# Patient Record
Sex: MALE | Race: WHITE | NOT HISPANIC OR LATINO | Employment: OTHER | RURAL
[De-identification: names, ages, dates, MRNs, and addresses within clinical notes are randomized per-mention and may not be internally consistent; named-entity substitution may affect disease eponyms.]

---

## 2018-01-03 ENCOUNTER — OFFICE VISIT (OUTPATIENT)
Dept: OTOLARYNGOLOGY | Facility: CLINIC | Age: 63
End: 2018-01-03

## 2018-01-03 VITALS — HEIGHT: 68 IN | TEMPERATURE: 96.7 F | WEIGHT: 162 LBS | BODY MASS INDEX: 24.55 KG/M2

## 2018-01-03 DIAGNOSIS — H90.3 SENSORINEURAL HEARING LOSS (SNHL) OF BOTH EARS: ICD-10-CM

## 2018-01-03 DIAGNOSIS — H69.91 EUSTACHIAN TUBE DISORDER, RIGHT: ICD-10-CM

## 2018-01-03 DIAGNOSIS — J34.2 NASAL SEPTAL DEVIATION: ICD-10-CM

## 2018-01-03 DIAGNOSIS — J34.3 NASAL TURBINATE HYPERTROPHY: ICD-10-CM

## 2018-01-03 DIAGNOSIS — J32.8 OTHER CHRONIC SINUSITIS: Primary | ICD-10-CM

## 2018-01-03 PROCEDURE — 99204 OFFICE O/P NEW MOD 45 MIN: CPT | Performed by: OTOLARYNGOLOGY

## 2018-01-03 RX ORDER — FLUTICASONE PROPIONATE 50 MCG
2 SPRAY, SUSPENSION (ML) NASAL
Qty: 18 G | Refills: 11 | Status: SHIPPED | OUTPATIENT
Start: 2018-01-03 | End: 2018-01-31

## 2018-01-03 RX ORDER — CETIRIZINE HYDROCHLORIDE 10 MG/1
TABLET ORAL
COMMUNITY
Start: 2017-12-28 | End: 2018-01-31

## 2018-01-03 RX ORDER — CEFDINIR 300 MG/1
300 CAPSULE ORAL
Qty: 36 CAPSULE | Refills: 0 | Status: SHIPPED | OUTPATIENT
Start: 2018-01-03 | End: 2018-01-31

## 2018-01-03 RX ORDER — FLUTICASONE PROPIONATE 50 MCG
SPRAY, SUSPENSION (ML) NASAL
COMMUNITY
Start: 2017-12-28 | End: 2018-01-03 | Stop reason: SDUPTHER

## 2018-01-03 NOTE — PROGRESS NOTES
Subjective   Joseph Heard is a 62 y.o. male.   Cc worsening hearing  And nasal obstruction  History of Present Illness     Patient comes in with a history of ear stopped up hearing worse and almost failed a CDL exam he has long-standing hearing loss from noise exposure is here does get worse and she had sinus infection congestion.  Still having persistent headache trouble breathing through his nose despite antibiotics and nasal sprays and antihistamines he comes in with audiogram shows hearing loss.  Says he uses hearing protection he does have tinnitus in both ears syringe showed symmetric hearing loss    The following portions of the patient's history were reviewed and updated as appropriate: allergies, current medications, past family history, past medical history, past social history, past surgical history and problem list.      Joseph Heard reports that he has never smoked. He has never used smokeless tobacco.  Patient is not a tobacco user and has not been counseled for use of tobacco products    Family History   Problem Relation Age of Onset   • Heart disease Mother    • Thyroid disease Mother    • Heart disease Father          Current Outpatient Prescriptions:   •  cetirizine (zyrTEC) 10 MG tablet, , Disp: , Rfl:   •  fluticasone (FLONASE) 50 MCG/ACT nasal spray, 2 sprays into each nostril 2 (Two) Times a Day., Disp: 18 g, Rfl: 11  •  cefdinir (OMNICEF) 300 MG capsule, Take 1 capsule by mouth 2 (Two) Times a Day. Take with food and probiotics and call office and stop if develop watery diarrhea., Disp: 36 capsule, Rfl: 0    No Known Allergies    No past medical history on file.      Review of Systems   HENT: Positive for congestion, hearing loss, sinus pressure and tinnitus. Negative for ear discharge, ear pain and facial swelling.    Hematological: Negative for adenopathy.   All other systems reviewed and are negative.          Objective   Physical Exam   Constitutional: He is oriented to person,  place, and time. He appears well-developed and well-nourished.   HENT:   Head: Normocephalic and atraumatic.   Right Ear: Hearing, tympanic membrane, external ear and ear canal normal.   Left Ear: Hearing, tympanic membrane, external ear and ear canal normal.   Nose: Mucosal edema, rhinorrhea, nasal deformity and septal deviation present. No sinus tenderness. No epistaxis. Right sinus exhibits no maxillary sinus tenderness and no frontal sinus tenderness. Left sinus exhibits no maxillary sinus tenderness and no frontal sinus tenderness.   Mouth/Throat: Uvula is midline, oropharynx is clear and moist and mucous membranes are normal. No trismus in the jaw. Normal dentition. No oropharyngeal exudate or posterior oropharyngeal edema. Tonsils are 1+ on the right. Tonsils are 1+ on the left. No tonsillar exudate.   Eyes: Conjunctivae are normal.   Neck: Normal range of motion. Neck supple. No JVD present. No tracheal deviation present. No thyromegaly present.   Cardiovascular: Normal rate.    Pulmonary/Chest: Effort normal.   Musculoskeletal: Normal range of motion.   Lymphadenopathy:        Head (right side): No submental, no submandibular, no tonsillar, no preauricular, no posterior auricular and no occipital adenopathy present.        Head (left side): No submental, no submandibular, no tonsillar, no preauricular, no posterior auricular and no occipital adenopathy present.     He has no cervical adenopathy.        Right cervical: No superficial cervical, no deep cervical and no posterior cervical adenopathy present.       Left cervical: No superficial cervical, no deep cervical and no posterior cervical adenopathy present.   Neurological: He is alert and oriented to person, place, and time. No cranial nerve deficit.   Skin: Skin is warm.   Psychiatric: He has a normal mood and affect. His speech is normal and behavior is normal. Thought content normal.   Nursing note and vitals reviewed.    See any obvious fluid today  though had an abnormal tympanogram previously month and have  See the audiogram which was reviewed with the patient      Assessment/Plan   Joseph was seen today for ear problem.    Diagnoses and all orders for this visit:    Other chronic sinusitis  -     CT Sinus Without Contrast; Future    Eustachian tube disorder, right    Sensorineural hearing loss (SNHL) of both ears    Nasal septal deviation    Nasal turbinate hypertrophy    Other orders  -     fluticasone (FLONASE) 50 MCG/ACT nasal spray; 2 sprays into each nostril 2 (Two) Times a Day.  -     cefdinir (OMNICEF) 300 MG capsule; Take 1 capsule by mouth 2 (Two) Times a Day. Take with food and probiotics and call office and stop if develop watery diarrhea.     noise protection    F/u CT  And med use  I explained use and side effects medications minimize side effects with food use and probiotics  We will repeat the tympanogram on follow-up

## 2018-01-31 ENCOUNTER — OFFICE VISIT (OUTPATIENT)
Dept: OTOLARYNGOLOGY | Facility: CLINIC | Age: 63
End: 2018-01-31

## 2018-01-31 VITALS — HEIGHT: 68 IN | TEMPERATURE: 98.5 F | BODY MASS INDEX: 24.25 KG/M2 | WEIGHT: 160 LBS

## 2018-01-31 DIAGNOSIS — J32.2 CHRONIC ETHMOIDAL SINUSITIS: ICD-10-CM

## 2018-01-31 DIAGNOSIS — J34.3 NASAL TURBINATE HYPERTROPHY: ICD-10-CM

## 2018-01-31 DIAGNOSIS — H69.91 EUSTACHIAN TUBE DISORDER, RIGHT: ICD-10-CM

## 2018-01-31 DIAGNOSIS — J01.10 ACUTE FRONTAL SINUSITIS, RECURRENCE NOT SPECIFIED: ICD-10-CM

## 2018-01-31 DIAGNOSIS — J34.2 NASAL SEPTAL DEVIATION: Primary | ICD-10-CM

## 2018-01-31 DIAGNOSIS — J34.89 NASAL VALVE COLLAPSE: ICD-10-CM

## 2018-01-31 PROCEDURE — 99214 OFFICE O/P EST MOD 30 MIN: CPT | Performed by: OTOLARYNGOLOGY

## 2018-01-31 RX ORDER — AMOXICILLIN AND CLAVULANATE POTASSIUM 875; 125 MG/1; MG/1
1 TABLET, FILM COATED ORAL EVERY 12 HOURS
Qty: 36 TABLET | Refills: 0 | Status: SHIPPED | OUTPATIENT
Start: 2018-01-31 | End: 2018-03-07

## 2018-01-31 RX ORDER — AZELASTINE 1 MG/ML
2 SPRAY, METERED NASAL
Qty: 30 ML | Refills: 5 | Status: ON HOLD | OUTPATIENT
Start: 2018-01-31 | End: 2018-04-06

## 2018-01-31 RX ORDER — ATORVASTATIN CALCIUM 10 MG/1
5 TABLET, FILM COATED ORAL NIGHTLY
Status: ON HOLD | COMMUNITY
Start: 2018-01-13 | End: 2020-05-29

## 2018-01-31 NOTE — PROGRESS NOTES
Subjective   Joseph Heard is a 62 y.o. male.   CC: F/u chronic sinusitis nasal obstruction  History of Present Illness   Patient still having frontal headaches especially in the mornings he's not had any fever chills is not particularly more one side to the other still has trouble breathing through his nose is had a little bit of epistaxis on the left side    Is not febrile and there is no neurologic complaints does have decreased sense of smell      The following portions of the patient's history were reviewed and updated as appropriate: allergies, current medications, past family history, past medical history, past social history, past surgical history and problem list.      Joseph Heard reports that he has never smoked. He has never used smokeless tobacco. He reports that he drinks alcohol. He reports that he does not use illicit drugs.  Patient is not a tobacco user and has not been counseled for use of tobacco products    Family History   Problem Relation Age of Onset   • Heart disease Mother    • Thyroid disease Mother    • Heart disease Father          Current Outpatient Prescriptions:   •  atorvastatin (LIPITOR) 10 MG tablet, Take 5 mg by mouth Daily., Disp: , Rfl:   •  amoxicillin-clavulanate (AUGMENTIN) 875-125 MG per tablet, Take 1 tablet by mouth Every 12 (Twelve) Hours., Disp: 36 tablet, Rfl: 0  •  azelastine (ASTELIN) 0.1 % nasal spray, 2 sprays into each nostril 2 (Two) Times a Day. Use in each nostril as directed, Disp: 30 mL, Rfl: 5    No Known Allergies    No past medical history on file.      Review of Systems   Constitutional: Negative for fever.   HENT: Positive for congestion, postnasal drip, rhinorrhea, sinus pain and sinus pressure. Negative for dental problem and facial swelling.    Neurological: Positive for headaches. Negative for facial asymmetry.   Hematological: Negative for adenopathy.           Objective   Physical Exam   Constitutional: He is oriented to person, place, and  time. He appears well-developed and well-nourished.   HENT:   Head: Normocephalic and atraumatic.   Right Ear: Tympanic membrane, external ear and ear canal normal.   Left Ear: Tympanic membrane, external ear and ear canal normal.   Nose: Mucosal edema, rhinorrhea, nasal deformity and septal deviation present. No sinus tenderness. No epistaxis. Right sinus exhibits no maxillary sinus tenderness and no frontal sinus tenderness. Left sinus exhibits no maxillary sinus tenderness and no frontal sinus tenderness.   Mouth/Throat: Uvula is midline, oropharynx is clear and moist and mucous membranes are normal. No trismus in the jaw. Normal dentition. No oropharyngeal exudate or posterior oropharyngeal edema. Tonsils are 1+ on the right. Tonsils are 1+ on the left. No tonsillar exudate.   Eyes: Conjunctivae are normal.   Neck: Normal range of motion. Neck supple. No JVD present. No tracheal deviation present. No thyromegaly present.   Cardiovascular: Normal rate.    Pulmonary/Chest: Effort normal.   Musculoskeletal: Normal range of motion.   Lymphadenopathy:        Head (right side): No submental, no submandibular, no tonsillar, no preauricular, no posterior auricular and no occipital adenopathy present.        Head (left side): No submental, no submandibular, no tonsillar, no preauricular, no posterior auricular and no occipital adenopathy present.     He has no cervical adenopathy.        Right cervical: No superficial cervical, no deep cervical and no posterior cervical adenopathy present.       Left cervical: No superficial cervical, no deep cervical and no posterior cervical adenopathy present.   Neurological: He is alert and oriented to person, place, and time. No cranial nerve deficit.   Skin: Skin is warm.   Psychiatric: He has a normal mood and affect. His speech is normal and behavior is normal. Thought content normal.   Nursing note and vitals reviewed.      I reviewed the report as well as 2 different CD scan of  the sinus disc from [facility] i did sinus CT stone 2 different occasions the most recent being a few days ago .  s still some mild sinusitis and some sinuses both sides but worse than left ethmoid frontal on the.  I reviewed this in detail with him showed and diagrams    he wants to avoid surgery possible sore ago treated with antibiotics and a different type of nasal spray.  See if it helps his eustachian tube symptoms to receive a some feedback on follow-up.  Improving and maintaining nasal surgery to help nasal valve collapse nasal septal deformity and sinusitis and turbinate reduction we discussed was found risks benefits of each approach and he prefers a medical approach first follow-up after she finishes medications he is to call if not improving or if he has questions or difficulty              Assessment/Plan   Joseph was seen today for follow-up.    Diagnoses and all orders for this visit:    Nasal septal deviation    Eustachian tube disorder, right    Nasal turbinate hypertrophy    Acute frontal sinusitis, recurrence not specified    Chronic ethmoidal sinusitis    Other orders  -     azelastine (ASTELIN) 0.1 % nasal spray; 2 sprays into each nostril 2 (Two) Times a Day. Use in each nostril as directed  -     amoxicillin-clavulanate (AUGMENTIN) 875-125 MG per tablet; Take 1 tablet by mouth Every 12 (Twelve) Hours.    Side effects of medication discussed in detail  Risk complications discussed in detail see above her tympanograms as well on follow-up

## 2018-02-02 DIAGNOSIS — J32.8 OTHER CHRONIC SINUSITIS: ICD-10-CM

## 2018-03-07 ENCOUNTER — OFFICE VISIT (OUTPATIENT)
Dept: OTOLARYNGOLOGY | Facility: CLINIC | Age: 63
End: 2018-03-07

## 2018-03-07 ENCOUNTER — PREP FOR SURGERY (OUTPATIENT)
Dept: OTHER | Facility: HOSPITAL | Age: 63
End: 2018-03-07

## 2018-03-07 VITALS — TEMPERATURE: 96.3 F | WEIGHT: 161 LBS | HEIGHT: 68 IN | BODY MASS INDEX: 24.4 KG/M2

## 2018-03-07 DIAGNOSIS — J34.3 NASAL TURBINATE HYPERTROPHY: ICD-10-CM

## 2018-03-07 DIAGNOSIS — J34.89 NASAL VALVE COLLAPSE: ICD-10-CM

## 2018-03-07 DIAGNOSIS — H90.3 SENSORINEURAL HEARING LOSS (SNHL) OF BOTH EARS: ICD-10-CM

## 2018-03-07 DIAGNOSIS — J32.2 CHRONIC ETHMOIDAL SINUSITIS: Primary | ICD-10-CM

## 2018-03-07 DIAGNOSIS — J34.2 NASAL SEPTAL DEVIATION: ICD-10-CM

## 2018-03-07 DIAGNOSIS — J34.89 NASAL VALVE STENOSIS: ICD-10-CM

## 2018-03-07 DIAGNOSIS — J32.4 PANSINUSITIS: Primary | ICD-10-CM

## 2018-03-07 DIAGNOSIS — J32.0 CHRONIC MAXILLARY SINUSITIS: ICD-10-CM

## 2018-03-07 DIAGNOSIS — H69.83 EUSTACHIAN TUBE DYSFUNCTION, BILATERAL: ICD-10-CM

## 2018-03-07 DIAGNOSIS — J32.1 CHRONIC FRONTAL SINUSITIS: ICD-10-CM

## 2018-03-07 DIAGNOSIS — H93.13 TINNITUS OF BOTH EARS: ICD-10-CM

## 2018-03-07 PROCEDURE — 99214 OFFICE O/P EST MOD 30 MIN: CPT | Performed by: OTOLARYNGOLOGY

## 2018-03-07 RX ORDER — OXYMETAZOLINE HYDROCHLORIDE 0.05 G/100ML
2 SPRAY NASAL
Status: CANCELLED | OUTPATIENT
Start: 2018-04-06

## 2018-03-07 RX ORDER — CLINDAMYCIN PHOSPHATE 900 MG/50ML
900 INJECTION INTRAVENOUS ONCE
Status: CANCELLED | OUTPATIENT
Start: 2018-04-06 | End: 2018-04-06

## 2018-03-07 RX ORDER — CETIRIZINE HYDROCHLORIDE 10 MG/1
10 TABLET ORAL NIGHTLY
COMMUNITY

## 2018-03-07 NOTE — PROGRESS NOTES
Subjective   Joseph Heard is a 62 y.o. male.   CC: Persistent nasal obstruction sinusitis  and eustachian tube problems  History of Present Illness   States she's intermittently having fullness and popping his ears and his rings worse at times his biggest complaint is nasal instruction rhinitis congestion he's continues Afrin regularly he said he uses other nasal sprays and took the antibiotics without success.  He has documented sinusitis based on CT scan.  He has decreased sense of smell from his breathing through his nose worse on left than right and intermittent epistaxis as a result of this.  7 with some drainage and postnasal drip is been treated with multiple medications without success.  He notes he has allergies and they've been worsening with the Pollok and increasing    Had 1 prior nasal surgery which helped some but didn't completely help his symptoms and he had subsequent nasal fracture which worsens his breathing much been particularly worse than last year or so  The following portions of the patient's history were reviewed and updated as appropriate: allergies, current medications, past family history, past medical history, past social history, past surgical history and problem list.      Joseph Heard reports that he has never smoked. He has never used smokeless tobacco. He reports that he drinks alcohol. He reports that he does not use illicit drugs.  Patient is not a tobacco user and has not been counseled for use of tobacco products    Family History   Problem Relation Age of Onset   • Heart disease Mother    • Thyroid disease Mother    • Heart disease Father          Current Outpatient Prescriptions:   •  atorvastatin (LIPITOR) 10 MG tablet, Take 5 mg by mouth Daily., Disp: , Rfl:   •  azelastine (ASTELIN) 0.1 % nasal spray, 2 sprays into each nostril 2 (Two) Times a Day. Use in each nostril as directed, Disp: 30 mL, Rfl: 5  •  cetirizine (zyrTEC) 10 MG tablet, Take 10 mg by mouth  Daily., Disp: , Rfl:     No Known Allergies    Past Medical History:   Diagnosis Date   • Hyperlipemia          Review of Systems   Constitutional: Negative for fever.   HENT: Positive for congestion, ear pain, hearing loss, nosebleeds, sinus pressure and tinnitus. Negative for facial swelling.    Respiratory: Negative for apnea.            Objective   Physical Exam   Constitutional: He is oriented to person, place, and time. He appears well-developed and well-nourished.   HENT:   Head: Normocephalic and atraumatic.   Right Ear: Hearing, external ear and ear canal normal. Tympanic membrane is retracted. No middle ear effusion.   Left Ear: Hearing, external ear and ear canal normal. Tympanic membrane is retracted.  No middle ear effusion.   Nose: Mucosal edema, sinus tenderness and septal deviation present. No rhinorrhea or nasal deformity. No epistaxis. Right sinus exhibits no maxillary sinus tenderness and no frontal sinus tenderness. Left sinus exhibits no maxillary sinus tenderness and no frontal sinus tenderness.       Mouth/Throat: Uvula is midline and oropharynx is clear and moist. No trismus in the jaw. Normal dentition. No oropharyngeal exudate or posterior oropharyngeal edema.   Eyes: Conjunctivae are normal.   Neck: Normal range of motion. Neck supple. No JVD present. No tracheal deviation present. No thyromegaly present.   Cardiovascular: Normal rate and regular rhythm.    Pulmonary/Chest: Effort normal and breath sounds normal.   Musculoskeletal: Normal range of motion.   Lymphadenopathy:        Head (right side): No submental, no submandibular, no tonsillar, no preauricular, no posterior auricular and no occipital adenopathy present.        Head (left side): No submental, no submandibular, no tonsillar, no preauricular, no posterior auricular and no occipital adenopathy present.     He has no cervical adenopathy.        Right cervical: No superficial cervical, no deep cervical and no posterior cervical  adenopathy present.       Left cervical: No superficial cervical, no deep cervical and no posterior cervical adenopathy present.   Neurological: He is alert and oriented to person, place, and time. No cranial nerve deficit.   Skin: Skin is warm.   Psychiatric: He has a normal mood and affect. His speech is normal and behavior is normal. Thought content normal.   Nursing note and vitals reviewed.    Has a ptotic tip of the nose with late nasal valve collapse worse on left and right explained and recommended doing surgery changes appearance but we could try and improve the airway    The CT scan was reviewed from [facility] reviewed with the patient showing sinusitis and all multiple sinuses and nasal obstruction    Assessment/Plan   Joseph was seen today for follow-up.    Diagnoses and all orders for this visit:    Chronic ethmoidal sinusitis    Nasal septal deviation    Sensorineural hearing loss (SNHL) of both ears    Nasal turbinate hypertrophy    Eustachian tube dysfunction, bilateral    Chronic maxillary sinusitis    Chronic frontal sinusitis    Tinnitus of both ears    Nasal valve collapse        She is not interested in having PE tubes placed sore getting audiogram tympanogram and it's available.  We discussed nasal surgery.  Failure rates and success rate were discussed there is no guarantee this was all the symptoms.  I told him is about 10% chance it would not control his breathing and  could make things worse.  I discussed that nasal bleeding will not be guaranteed to be  controlled and certainly allergies are not controlled with surgery and will need continue treatment for that discussed risk of damage to his eye brain decreased vision double vision failure to he'll need for further treatment limitations of activity.  Discussed importance of stopping aspirin and vitamin E and fish well week before the week after surgery to minimize the risk of bleeding and limitations of activity.     Explained that  there could be some change in appearance of probably wouldn't be great if we're to try and improve his nasal valve and photographed and he understands and accepts that.  Now on PE tubes I discussed the eustachian tube problems may persist may need further treatment is to call for questions.  Denies other significant medical illness which would contraindicate surgery or anesthesia.      Talked about the sinus surgery  as it relates to sinus CT findings previous treatment think that be beneficial to open up the maxillary ethmoid sinuses possibly frontal (findings he agrees with this and understands limitations recovery rates success rate does not want consider further medical therapy is been on multiple medications including steroid nasal sprays and antihistamine nasal sprays over-the-counter antihistamines multiple antibiotics his CT documented disease

## 2018-04-02 ENCOUNTER — APPOINTMENT (OUTPATIENT)
Dept: PREADMISSION TESTING | Facility: HOSPITAL | Age: 63
End: 2018-04-02

## 2018-04-02 VITALS
DIASTOLIC BLOOD PRESSURE: 68 MMHG | BODY MASS INDEX: 25.01 KG/M2 | WEIGHT: 165 LBS | RESPIRATION RATE: 18 BRPM | HEIGHT: 68 IN | HEART RATE: 68 BPM | OXYGEN SATURATION: 97 % | SYSTOLIC BLOOD PRESSURE: 122 MMHG

## 2018-04-02 RX ORDER — ASPIRIN 81 MG/1
81 TABLET, CHEWABLE ORAL DAILY
COMMUNITY
End: 2018-04-06 | Stop reason: HOSPADM

## 2018-04-02 RX ORDER — CHLORAL HYDRATE 500 MG
1000 CAPSULE ORAL TAKE AS DIRECTED
COMMUNITY
End: 2018-04-25

## 2018-04-02 RX ORDER — SODIUM CHLORIDE, SODIUM GLUCONATE, SODIUM ACETATE, POTASSIUM CHLORIDE, AND MAGNESIUM CHLORIDE 526; 502; 368; 37; 30 MG/100ML; MG/100ML; MG/100ML; MG/100ML; MG/100ML
1000 INJECTION, SOLUTION INTRAVENOUS CONTINUOUS PRN
Status: CANCELLED | OUTPATIENT
Start: 2018-04-06

## 2018-04-02 RX ORDER — VITAMIN E 268 MG
400 CAPSULE ORAL TAKE AS DIRECTED
COMMUNITY
End: 2018-04-25

## 2018-04-02 RX ORDER — ASCORBIC ACID 500 MG
500 TABLET ORAL NIGHTLY
COMMUNITY
End: 2021-12-20

## 2018-04-05 NOTE — H&P
Subjective      Joseph Heard is a 62 y.o. male.   CC: Persistent nasal obstruction sinusitis  and eustachian tube problems  History of Present Illness   States she's intermittently having fullness and popping his ears and his rings worse at times his biggest complaint is nasal instruction rhinitis congestion he's continues Afrin regularly he said he uses other nasal sprays and took the antibiotics without success.  He has documented sinusitis based on CT scan.  He has decreased sense of smell from his breathing through his nose worse on left than right and intermittent epistaxis as a result of this.  7 with some drainage and postnasal drip is been treated with multiple medications without success.  He notes he has allergies and they've been worsening with the Pollok and increasing     Had 1 prior nasal surgery which helped some but didn't completely help his symptoms and he had subsequent nasal fracture which worsens his breathing much been particularly worse than last year or so  The following portions of the patient's history were reviewed and updated as appropriate: allergies, current medications, past family history, past medical history, past social history, past surgical history and problem list.        Joseph Heard reports that he has never smoked. He has never used smokeless tobacco. He reports that he drinks alcohol. He reports that he does not use illicit drugs.  Patient is not a tobacco user and has not been counseled for use of tobacco products           Family History   Problem Relation Age of Onset   • Heart disease Mother     • Thyroid disease Mother     • Heart disease Father              Current Outpatient Prescriptions:   •  atorvastatin (LIPITOR) 10 MG tablet, Take 5 mg by mouth Daily., Disp: , Rfl:   •  azelastine (ASTELIN) 0.1 % nasal spray, 2 sprays into each nostril 2 (Two) Times a Day. Use in each nostril as directed, Disp: 30 mL, Rfl: 5  •  cetirizine (zyrTEC) 10 MG tablet, Take 10  mg by mouth Daily., Disp: , Rfl:      No Known Allergies     Medical History        Past Medical History:   Diagnosis Date   • Hyperlipemia                 Review of Systems   Constitutional: Negative for fever.   HENT: Positive for congestion, ear pain, hearing loss, nosebleeds, sinus pressure and tinnitus. Negative for facial swelling.    Respiratory: Negative for apnea.                   Objective      Physical Exam   Constitutional: He is oriented to person, place, and time. He appears well-developed and well-nourished.   HENT:   Head: Normocephalic and atraumatic.   Right Ear: Hearing, external ear and ear canal normal. Tympanic membrane is retracted. No middle ear effusion.   Left Ear: Hearing, external ear and ear canal normal. Tympanic membrane is retracted.  No middle ear effusion.   Nose: Mucosal edema, sinus tenderness and septal deviation present. No rhinorrhea or nasal deformity. No epistaxis. Right sinus exhibits no maxillary sinus tenderness and no frontal sinus tenderness. Left sinus exhibits no maxillary sinus tenderness and no frontal sinus tenderness.       Mouth/Throat: Uvula is midline and oropharynx is clear and moist. No trismus in the jaw. Normal dentition. No oropharyngeal exudate or posterior oropharyngeal edema.   Eyes: Conjunctivae are normal.   Neck: Normal range of motion. Neck supple. No JVD present. No tracheal deviation present. No thyromegaly present.   Cardiovascular: Normal rate and regular rhythm.    Pulmonary/Chest: Effort normal and breath sounds normal.   Musculoskeletal: Normal range of motion.   Lymphadenopathy:        Head (right side): No submental, no submandibular, no tonsillar, no preauricular, no posterior auricular and no occipital adenopathy present.        Head (left side): No submental, no submandibular, no tonsillar, no preauricular, no posterior auricular and no occipital adenopathy present.     He has no cervical adenopathy.        Right cervical: No superficial  cervical, no deep cervical and no posterior cervical adenopathy present.       Left cervical: No superficial cervical, no deep cervical and no posterior cervical adenopathy present.   Neurological: He is alert and oriented to person, place, and time. No cranial nerve deficit.   Skin: Skin is warm.   Psychiatric: He has a normal mood and affect. His speech is normal and behavior is normal. Thought content normal.   Nursing note and vitals reviewed.     Has a ptotic tip of the nose with late nasal valve collapse worse on left and right explained and recommended doing surgery changes appearance but we could try and improve the airway     The CT scan was reviewed from [facility] reviewed with the patient showing sinusitis and all multiple sinuses and nasal obstruction        Assessment/Plan      Joseph was seen today for follow-up.     Diagnoses and all orders for this visit:     Chronic ethmoidal sinusitis     Nasal septal deviation     Sensorineural hearing loss (SNHL) of both ears     Nasal turbinate hypertrophy     Eustachian tube dysfunction, bilateral     Chronic maxillary sinusitis     Chronic frontal sinusitis     Tinnitus of both ears     Nasal valve collapse           She is not interested in having PE tubes placed sore getting audiogram tympanogram and it's available.  We discussed nasal surgery.  Failure rates and success rate were discussed there is no guarantee this was all the symptoms.  I told him is about 10% chance it would not control his breathing and  could make things worse.  I discussed that nasal bleeding will not be guaranteed to be  controlled and certainly allergies are not controlled with surgery and will need continue treatment for that discussed risk of damage to his eye brain decreased vision double vision failure to he'll need for further treatment limitations of activity.  Discussed importance of stopping aspirin and vitamin E and fish well week before the week after surgery to  minimize the risk of bleeding and limitations of activity.      Explained that there could be some change in appearance of probably wouldn't be great if we're to try and improve his nasal valve and photographed and he understands and accepts that.  Now on PE tubes I discussed the eustachian tube problems may persist may need further treatment is to call for questions.  Denies other significant medical illness which would contraindicate surgery or anesthesia.        Talked about the sinus surgery  as it relates to sinus CT findings previous treatment think that be beneficial to open up the maxillary ethmoid sinuses possibly frontal (findings he agrees with this and understands limitations recovery rates success rate does not want consider further medical therapy is been on multiple medications including steroid nasal sprays and antihistamine nasal sprays over-the-counter antihistamines multiple antibiotics his CT documented disease

## 2018-04-06 ENCOUNTER — ANESTHESIA EVENT (OUTPATIENT)
Dept: PERIOP | Facility: HOSPITAL | Age: 63
End: 2018-04-06

## 2018-04-06 ENCOUNTER — ANESTHESIA (OUTPATIENT)
Dept: PERIOP | Facility: HOSPITAL | Age: 63
End: 2018-04-06

## 2018-04-06 ENCOUNTER — HOSPITAL ENCOUNTER (OUTPATIENT)
Facility: HOSPITAL | Age: 63
Setting detail: HOSPITAL OUTPATIENT SURGERY
Discharge: HOME | End: 2018-04-06
Attending: OTOLARYNGOLOGY | Admitting: OTOLARYNGOLOGY

## 2018-04-06 VITALS
RESPIRATION RATE: 18 BRPM | WEIGHT: 163.14 LBS | SYSTOLIC BLOOD PRESSURE: 139 MMHG | TEMPERATURE: 97.8 F | DIASTOLIC BLOOD PRESSURE: 74 MMHG | BODY MASS INDEX: 24.73 KG/M2 | OXYGEN SATURATION: 96 % | HEIGHT: 68 IN | HEART RATE: 78 BPM

## 2018-04-06 DIAGNOSIS — J34.3 NASAL TURBINATE HYPERTROPHY: ICD-10-CM

## 2018-04-06 DIAGNOSIS — J32.4 PANSINUSITIS: ICD-10-CM

## 2018-04-06 DIAGNOSIS — J34.2 NASAL SEPTAL DEVIATION: ICD-10-CM

## 2018-04-06 DIAGNOSIS — J34.89 NASAL VALVE STENOSIS: ICD-10-CM

## 2018-04-06 PROCEDURE — 31253 NSL/SINS NDSC TOTAL: CPT | Performed by: OTOLARYNGOLOGY

## 2018-04-06 PROCEDURE — 25010000002 MIDAZOLAM PER 1 MG: Performed by: NURSE ANESTHETIST, CERTIFIED REGISTERED

## 2018-04-06 PROCEDURE — 25010000002 FENTANYL CITRATE (PF) 250 MCG/5ML SOLUTION: Performed by: NURSE ANESTHETIST, CERTIFIED REGISTERED

## 2018-04-06 PROCEDURE — 88305 TISSUE EXAM BY PATHOLOGIST: CPT | Performed by: PATHOLOGY

## 2018-04-06 PROCEDURE — 30930 THER FX NASAL INF TURBINATE: CPT | Performed by: OTOLARYNGOLOGY

## 2018-04-06 PROCEDURE — 30465 REPAIR NASAL STENOSIS: CPT | Performed by: OTOLARYNGOLOGY

## 2018-04-06 PROCEDURE — 31267 ENDOSCOPY MAXILLARY SINUS: CPT | Performed by: OTOLARYNGOLOGY

## 2018-04-06 PROCEDURE — 88305 TISSUE EXAM BY PATHOLOGIST: CPT | Performed by: OTOLARYNGOLOGY

## 2018-04-06 PROCEDURE — 25010000002 NEOSTIGMINE 10 MG/10ML SOLUTION: Performed by: NURSE ANESTHETIST, CERTIFIED REGISTERED

## 2018-04-06 PROCEDURE — 25010000002 DEXAMETHASONE PER 1 MG: Performed by: NURSE ANESTHETIST, CERTIFIED REGISTERED

## 2018-04-06 PROCEDURE — 25010000002 ONDANSETRON PER 1 MG: Performed by: NURSE ANESTHETIST, CERTIFIED REGISTERED

## 2018-04-06 PROCEDURE — 25010000002 PROPOFOL 10 MG/ML EMULSION: Performed by: NURSE ANESTHETIST, CERTIFIED REGISTERED

## 2018-04-06 RX ORDER — FENTANYL CITRATE 50 UG/ML
INJECTION, SOLUTION INTRAMUSCULAR; INTRAVENOUS AS NEEDED
Status: DISCONTINUED | OUTPATIENT
Start: 2018-04-06 | End: 2018-04-06 | Stop reason: SURG

## 2018-04-06 RX ORDER — OXYMETAZOLINE HYDROCHLORIDE 0.05 G/100ML
2 SPRAY NASAL
Status: COMPLETED | OUTPATIENT
Start: 2018-04-06 | End: 2018-04-06

## 2018-04-06 RX ORDER — LIDOCAINE HYDROCHLORIDE AND EPINEPHRINE 10; 10 MG/ML; UG/ML
INJECTION, SOLUTION INFILTRATION; PERINEURAL AS NEEDED
Status: DISCONTINUED | OUTPATIENT
Start: 2018-04-06 | End: 2018-04-06 | Stop reason: HOSPADM

## 2018-04-06 RX ORDER — HYDROCODONE BITARTRATE AND ACETAMINOPHEN 5; 325 MG/1; MG/1
1-2 TABLET ORAL EVERY 6 HOURS PRN
Qty: 20 TABLET | Refills: 0 | Status: SHIPPED | OUTPATIENT
Start: 2018-04-06 | End: 2018-04-11

## 2018-04-06 RX ORDER — HYDROCODONE BITARTRATE AND ACETAMINOPHEN 5; 325 MG/1; MG/1
1 TABLET ORAL ONCE AS NEEDED
Status: DISCONTINUED | OUTPATIENT
Start: 2018-04-06 | End: 2018-04-06 | Stop reason: HOSPADM

## 2018-04-06 RX ORDER — MEPERIDINE HYDROCHLORIDE 50 MG/ML
12.5 INJECTION INTRAMUSCULAR; INTRAVENOUS; SUBCUTANEOUS
Status: DISCONTINUED | OUTPATIENT
Start: 2018-04-06 | End: 2018-04-06 | Stop reason: HOSPADM

## 2018-04-06 RX ORDER — DIPHENHYDRAMINE HYDROCHLORIDE 50 MG/ML
12.5 INJECTION INTRAMUSCULAR; INTRAVENOUS
Status: DISCONTINUED | OUTPATIENT
Start: 2018-04-06 | End: 2018-04-06 | Stop reason: HOSPADM

## 2018-04-06 RX ORDER — EPHEDRINE SULFATE 50 MG/ML
5 INJECTION, SOLUTION INTRAVENOUS ONCE AS NEEDED
Status: DISCONTINUED | OUTPATIENT
Start: 2018-04-06 | End: 2018-04-06 | Stop reason: HOSPADM

## 2018-04-06 RX ORDER — DEXAMETHASONE SODIUM PHOSPHATE 4 MG/ML
INJECTION, SOLUTION INTRA-ARTICULAR; INTRALESIONAL; INTRAMUSCULAR; INTRAVENOUS; SOFT TISSUE AS NEEDED
Status: DISCONTINUED | OUTPATIENT
Start: 2018-04-06 | End: 2018-04-06 | Stop reason: SURG

## 2018-04-06 RX ORDER — PROPOFOL 10 MG/ML
VIAL (ML) INTRAVENOUS AS NEEDED
Status: DISCONTINUED | OUTPATIENT
Start: 2018-04-06 | End: 2018-04-06 | Stop reason: SURG

## 2018-04-06 RX ORDER — GLYCOPYRROLATE 0.2 MG/ML
INJECTION INTRAMUSCULAR; INTRAVENOUS AS NEEDED
Status: DISCONTINUED | OUTPATIENT
Start: 2018-04-06 | End: 2018-04-06 | Stop reason: SURG

## 2018-04-06 RX ORDER — FLUTICASONE PROPIONATE 50 MCG
1 SPRAY, SUSPENSION (ML) NASAL NIGHTLY
COMMUNITY
End: 2018-04-06 | Stop reason: HOSPADM

## 2018-04-06 RX ORDER — NALOXONE HCL 0.4 MG/ML
0.2 VIAL (ML) INJECTION AS NEEDED
Status: DISCONTINUED | OUTPATIENT
Start: 2018-04-06 | End: 2018-04-06 | Stop reason: HOSPADM

## 2018-04-06 RX ORDER — ROCURONIUM BROMIDE 10 MG/ML
INJECTION, SOLUTION INTRAVENOUS AS NEEDED
Status: DISCONTINUED | OUTPATIENT
Start: 2018-04-06 | End: 2018-04-06 | Stop reason: SURG

## 2018-04-06 RX ORDER — LABETALOL HYDROCHLORIDE 5 MG/ML
5 INJECTION, SOLUTION INTRAVENOUS
Status: DISCONTINUED | OUTPATIENT
Start: 2018-04-06 | End: 2018-04-06 | Stop reason: HOSPADM

## 2018-04-06 RX ORDER — FLUMAZENIL 0.1 MG/ML
0.2 INJECTION INTRAVENOUS AS NEEDED
Status: DISCONTINUED | OUTPATIENT
Start: 2018-04-06 | End: 2018-04-06 | Stop reason: HOSPADM

## 2018-04-06 RX ORDER — IBUPROFEN 600 MG/1
600 TABLET ORAL EVERY 6 HOURS PRN
Status: DISCONTINUED | OUTPATIENT
Start: 2018-04-06 | End: 2018-04-06 | Stop reason: HOSPADM

## 2018-04-06 RX ORDER — ONDANSETRON 2 MG/ML
INJECTION INTRAMUSCULAR; INTRAVENOUS AS NEEDED
Status: DISCONTINUED | OUTPATIENT
Start: 2018-04-06 | End: 2018-04-06 | Stop reason: SURG

## 2018-04-06 RX ORDER — CLINDAMYCIN PHOSPHATE 900 MG/50ML
900 INJECTION INTRAVENOUS ONCE
Status: COMPLETED | OUTPATIENT
Start: 2018-04-06 | End: 2018-04-06

## 2018-04-06 RX ORDER — ONDANSETRON 2 MG/ML
4 INJECTION INTRAMUSCULAR; INTRAVENOUS ONCE AS NEEDED
Status: DISCONTINUED | OUTPATIENT
Start: 2018-04-06 | End: 2018-04-06 | Stop reason: HOSPADM

## 2018-04-06 RX ORDER — ACETAMINOPHEN 650 MG/1
650 SUPPOSITORY RECTAL ONCE AS NEEDED
Status: DISCONTINUED | OUTPATIENT
Start: 2018-04-06 | End: 2018-04-06 | Stop reason: HOSPADM

## 2018-04-06 RX ORDER — CEFDINIR 300 MG/1
300 CAPSULE ORAL 2 TIMES DAILY
Qty: 20 CAPSULE | Refills: 0 | Status: SHIPPED | OUTPATIENT
Start: 2018-04-06 | End: 2018-04-25 | Stop reason: SDUPTHER

## 2018-04-06 RX ORDER — LIDOCAINE HYDROCHLORIDE 20 MG/ML
INJECTION, SOLUTION INFILTRATION; PERINEURAL AS NEEDED
Status: DISCONTINUED | OUTPATIENT
Start: 2018-04-06 | End: 2018-04-06 | Stop reason: SURG

## 2018-04-06 RX ORDER — OXYMETAZOLINE HYDROCHLORIDE 0.05 G/100ML
SPRAY NASAL AS NEEDED
Status: DISCONTINUED | OUTPATIENT
Start: 2018-04-06 | End: 2018-04-06 | Stop reason: HOSPADM

## 2018-04-06 RX ORDER — MIDAZOLAM HYDROCHLORIDE 1 MG/ML
INJECTION INTRAMUSCULAR; INTRAVENOUS AS NEEDED
Status: DISCONTINUED | OUTPATIENT
Start: 2018-04-06 | End: 2018-04-06 | Stop reason: SURG

## 2018-04-06 RX ORDER — NEOSTIGMINE METHYLSULFATE 1 MG/ML
INJECTION, SOLUTION INTRAVENOUS AS NEEDED
Status: DISCONTINUED | OUTPATIENT
Start: 2018-04-06 | End: 2018-04-06 | Stop reason: SURG

## 2018-04-06 RX ORDER — SODIUM CHLORIDE, SODIUM GLUCONATE, SODIUM ACETATE, POTASSIUM CHLORIDE, AND MAGNESIUM CHLORIDE 526; 502; 368; 37; 30 MG/100ML; MG/100ML; MG/100ML; MG/100ML; MG/100ML
1000 INJECTION, SOLUTION INTRAVENOUS CONTINUOUS PRN
Status: DISCONTINUED | OUTPATIENT
Start: 2018-04-06 | End: 2018-04-06 | Stop reason: HOSPADM

## 2018-04-06 RX ORDER — ACETAMINOPHEN 325 MG/1
650 TABLET ORAL ONCE AS NEEDED
Status: DISCONTINUED | OUTPATIENT
Start: 2018-04-06 | End: 2018-04-06 | Stop reason: HOSPADM

## 2018-04-06 RX ADMIN — NEOSTIGMINE METHYLSULFATE 3 MG: 1 INJECTION, SOLUTION INTRAVENOUS at 10:41

## 2018-04-06 RX ADMIN — MIDAZOLAM 2 MG: 1 INJECTION INTRAMUSCULAR; INTRAVENOUS at 09:44

## 2018-04-06 RX ADMIN — OXYMETAZOLINE HYDROCHLORIDE 2 SPRAY: 5 SPRAY NASAL at 08:37

## 2018-04-06 RX ADMIN — ROCURONIUM BROMIDE 30 MG: 10 INJECTION INTRAVENOUS at 09:50

## 2018-04-06 RX ADMIN — CLINDAMYCIN IN 5 PERCENT DEXTROSE 900 MG: 18 INJECTION, SOLUTION INTRAVENOUS at 09:55

## 2018-04-06 RX ADMIN — SODIUM CHLORIDE, SODIUM GLUCONATE, SODIUM ACETATE, POTASSIUM CHLORIDE, AND MAGNESIUM CHLORIDE 1000 ML: 526; 502; 368; 37; 30 INJECTION, SOLUTION INTRAVENOUS at 08:51

## 2018-04-06 RX ADMIN — MINERAL OIL AND PETROLATUM 0.1 ML: 150; 830 OINTMENT OPHTHALMIC at 09:58

## 2018-04-06 RX ADMIN — SODIUM CHLORIDE, SODIUM GLUCONATE, SODIUM ACETATE, POTASSIUM CHLORIDE, AND MAGNESIUM CHLORIDE: 526; 502; 368; 37; 30 INJECTION, SOLUTION INTRAVENOUS at 10:46

## 2018-04-06 RX ADMIN — DEXAMETHASONE SODIUM PHOSPHATE 4 MG: 4 INJECTION, SOLUTION INTRAMUSCULAR; INTRAVENOUS at 09:55

## 2018-04-06 RX ADMIN — FENTANYL CITRATE 50 MCG: 50 INJECTION, SOLUTION INTRAMUSCULAR; INTRAVENOUS at 09:45

## 2018-04-06 RX ADMIN — PROPOFOL 120 MG: 10 INJECTION, EMULSION INTRAVENOUS at 09:50

## 2018-04-06 RX ADMIN — ONDANSETRON 4 MG: 2 INJECTION INTRAMUSCULAR; INTRAVENOUS at 10:29

## 2018-04-06 RX ADMIN — FENTANYL CITRATE 50 MCG: 50 INJECTION, SOLUTION INTRAMUSCULAR; INTRAVENOUS at 10:05

## 2018-04-06 RX ADMIN — GLYCOPYRROLATE 0.6 MG: 0.2 INJECTION, SOLUTION INTRAMUSCULAR; INTRAVENOUS at 10:41

## 2018-04-06 RX ADMIN — LIDOCAINE HYDROCHLORIDE 70 MG: 20 INJECTION, SOLUTION INFILTRATION; PERINEURAL at 09:50

## 2018-04-06 NOTE — OP NOTE
OPERATIVE NOTE    Name:    Joseph Heard  YOB: 1955  Date of surgery:   4/6/2018    Pre-op Diagnosis:   Nasal turbinate hypertrophy [J34.3]  Nasal septal deviation [J34.2]  Pansinusitis [J32.4]  Nasal valve stenosis [J34.89]    Post-op Diagnosis:    Post-Op Diagnosis Codes:     * Nasal turbinate hypertrophy [J34.3]     * Nasal septal deviation [J34.2]     * Pansinusitis [J32.4]     * Nasal valve stenosis [J34.89]    Procedure:  Procedure(s):  BILATERAL ENDOSCOPIC SINUS SURGERY OF MAXILLARY ETHMOID  AND   FRONTAL, NASAL SEPTAL ON THE RIGHT SIDE AND ON THE Left SIDE THE MAXILLARY ANTROSTOMY WITH MUCOSAL REMOVAL AND ANTERIOR ETHMOIDECTOMY WERE CARRIED OUT AND NASAL VALVE REPAIR   outfracturing of the inferior turbinates    Surgeon:  Saturnino Chambers MD, AAOHNS    Anesthesia: General with local approximately 17 mL 1% lidocaine with 1:100,000 epinephrine    Staff:   Circulator: Saundra Hummel RN  Scrub Person: Oswaldo Paul V  Assistant: Tonia Disla    Estimated Blood Loss:  20 ml  Specimens:                ID Type Source Tests Collected by Time   A : LEFT SINUS CONTENTS Tissue Sinus, ethmoid left TISSUE PATHOLOGY EXAM Saturnino Chambers MD 4/6/2018 1058   B : RIGHT SINUS CONTENTS Tissue Sinus, ethmoid right TISSUE PATHOLOGY EXAM Saturnino Chambers MD 4/6/2018 1058         Drains:  None    Findings:  Severely deviated septum with severe valve stenosis polypoid sinus disease    Complications: None    IMPLANTS:   Nothing was implanted during the procedure  [place] splints were placed and sutured with nylon  INDICATIONS: A shunt family medical therapy for chronic sinusitis and nasal obstruction.  Multiple months of treatment incidence CT documented disease ×2 he preferred surgical pressure or further medical therapy risk limitations failure rates success rate discussed and all questions answered    PROCEDURE: Patient was taken operating room placed in supine position.  Gen. anesthesia carried out.   The timeout was carried out.  CT scan was performed upon the monitor reviewed.  The nose is injected with local anesthetic as above.  Afrin pledgets were placed and nose.  He patient was prepped and draped.    Attention is then taken to the nasal septum hemitransfixion incision was made on the left mucoperichondrial periosteal flap was elevated and then fracturing the bone was carried out his ears are missing tissue very minimal amount of cartilage was removed.    The flaps were then sutured back and positions a whipstitch type suture with chromic.  Stitches in take the lateral nasal cells releasing incision made for the nose just inside the nostril extending to the bony floor to allow for the nostrils to open up because they were tented because a previous deformity.  N incision was made in the intercartilaginous area and multiple sutures use Afrin removing some of the mucosa in the overlapping cartilage and sutured in place to improve the nasal valve and I toncrease the angle between the septum and the lateral nasal wall.  This is done with PDS suture.   Attention  Taken to the ostiomeatal unit sickle knife was used to outline the uncinate process on the left first and then utilizing cold steel instruments and the microdebrider and  the maxillary sinus antrostomy was opened as were anterior to posterior ethmoid cells and the left frontal sinus recess on the left.  No evidence of orbital fat or CSF other abnormality and polypoid tissue was found in multiple areas more than suspected based on the CT findings.   On the right similar procedure some but only anterior ethmoidectomy and right maxillary antrostomy with polypoid tissue removal.  Small amount of middle turbinate was removed and medialized.  Same cold steel and microdebrider and switch were utilized to enlarge the maxillary antrostomy and opening anterior ethmoid cells the other middle cells appeared to be clear.  Infection rates are noted not to be that  large and  already been partially resected so they were just outfractured.    The nose was irrigated and reinspected no severe bleeding found pupils checked and were equal and then a splint was sutured in with nylon suture to help coapt the flaps and give some support for the first week consisting of a rooter bivalve splint.  There is no unusual bleeding noted and the pharynx were the nose at the end the procedure.  Patient will contact recovery in stable condition instructions given to the family             This document has been electronically signed by Saturnino Chambers MD on April 6, 2018 11:22 AM

## 2018-04-06 NOTE — ANESTHESIA PREPROCEDURE EVALUATION
Anesthesia Evaluation     Patient summary reviewed and Nursing notes reviewed   NPO Solid Status: > 8 hours  NPO Liquid Status: > 8 hours           Airway   Mallampati: II  TM distance: >3 FB  Neck ROM: full  possible difficult intubation  Dental    (+) poor dentation    Pulmonary - negative pulmonary ROS and normal exam    breath sounds clear to auscultation  (-) not a smoker  Cardiovascular - normal exam    Rhythm: regular  Rate: normal    (+) hyperlipidemia,       Neuro/Psych  (+) headaches,     GI/Hepatic/Renal/Endo - negative ROS     Musculoskeletal (-) negative ROS    Abdominal    Substance History - negative use     OB/GYN negative ob/gyn ROS         Other - negative ROS                       Anesthesia Plan    ASA 3     general     intravenous induction   Anesthetic plan and risks discussed with patient and spouse/significant other.

## 2018-04-06 NOTE — ANESTHESIA POSTPROCEDURE EVALUATION
Patient: Joseph Heard    Procedure Summary     Date:  04/06/18 Room / Location:  [facility] OR  / [facility] OR    Anesthesia Start:  0944 Anesthesia Stop:  1121    Procedures:       BILATERAL ENDOSCOPIC SINUS SURGERY OF MAXILLARY ETHMOID (N/A Nose)      AND POSSIBLE FRONTAL, NASAL SEPTAL AND NASAL VALVE REPAIR AND BILATERAL TURBINATE SURGERY (Left ) Diagnosis:       Nasal turbinate hypertrophy      Nasal septal deviation      Pansinusitis      Nasal valve stenosis      (Nasal turbinate hypertrophy [J34.3])      (Nasal septal deviation [J34.2])      (Pansinusitis [J32.4])      (Nasal valve stenosis [J34.89])    Surgeon:  Saturnino Chambers MD Provider:  Yunior Ackerman MD    Anesthesia Type:  general ASA Status:  3          Anesthesia Type: general  Last vitals  BP   124/94 (04/06/18 0841)   Temp   96.9 °F (36.1 °C) (04/06/18 0841)   Pulse   62 (04/06/18 0841)   Resp   18 (04/06/18 0841)     SpO2   99 % (04/06/18 0841)     Post Anesthesia Care and Evaluation    Patient location during evaluation: PACU  Patient participation: complete - patient participated  Level of consciousness: awake and awake and alert  Pain management: adequate  Airway patency: patent  Anesthetic complications: No anesthetic complications    Cardiovascular status: acceptable  Respiratory status: acceptable  Hydration status: acceptable

## 2018-04-06 NOTE — ANESTHESIA PROCEDURE NOTES
Airway  Urgency: elective    Airway not difficult    General Information and Staff    Patient location during procedure: OR  CRNA: JOAN NGUYEN    Indications and Patient Condition    Preoxygenated: yes  Mask difficulty assessment: 1 - vent by mask    Final Airway Details  Final airway type: endotracheal airway      Successful airway: ETT and IRVING tube  Cuffed: yes   Successful intubation technique: direct laryngoscopy  Facilitating devices/methods: intubating stylet  Endotracheal tube insertion site: oral  Blade: Kowalski  Blade size: #3  Cormack-Lehane Classification: grade I - full view of glottis  Placement verified by: chest auscultation and capnometry   Cuff volume (mL): 10  Measured from: lips  Number of attempts at approach: 1    Additional Comments  Lips and teeth in preanesthetic condition

## 2018-04-06 NOTE — INTERVAL H&P NOTE
No new medical issues noted.  All questions answered.  Vitals reviewed.  KINJAL Chambers MD

## 2018-04-09 LAB
LAB AP CASE REPORT: NORMAL
Lab: NORMAL
PATH REPORT.FINAL DX SPEC: NORMAL
PATH REPORT.GROSS SPEC: NORMAL

## 2018-04-11 ENCOUNTER — OFFICE VISIT (OUTPATIENT)
Dept: OTOLARYNGOLOGY | Facility: CLINIC | Age: 63
End: 2018-04-11

## 2018-04-11 VITALS — BODY MASS INDEX: 25.31 KG/M2 | WEIGHT: 167 LBS | TEMPERATURE: 97.8 F | HEIGHT: 68 IN

## 2018-04-11 DIAGNOSIS — J32.1 CHRONIC FRONTAL SINUSITIS: ICD-10-CM

## 2018-04-11 DIAGNOSIS — J34.3 NASAL TURBINATE HYPERTROPHY: ICD-10-CM

## 2018-04-11 DIAGNOSIS — J32.0 CHRONIC MAXILLARY SINUSITIS: ICD-10-CM

## 2018-04-11 DIAGNOSIS — J32.2 CHRONIC ETHMOIDAL SINUSITIS: Primary | ICD-10-CM

## 2018-04-11 PROCEDURE — 99024 POSTOP FOLLOW-UP VISIT: CPT | Performed by: OTOLARYNGOLOGY

## 2018-04-11 PROCEDURE — 31237 NSL/SINS NDSC SURG BX POLYPC: CPT | Performed by: OTOLARYNGOLOGY

## 2018-04-11 NOTE — PATIENT INSTRUCTIONS

## 2018-04-11 NOTE — PROGRESS NOTES
OPERATIVE NOTE    Name:    oJseph Heard  YOB: 1955  Date of surgery:       Pre-op Diagnosis:   Chronic sinusitis- multiple sinuses  Post-op Diagnosis:   Same  Procedure: Nasal endoscopy with bilateral sinus endoscopic debridement of impacted maxillary sinus and ostiomeatal units      Surgeon:  Saturnino Chambers MD, AAOHNS    Anesthesia:  Topical local-lidocane    Staff:    MERLIN Disla    Estimated Blood Loss none    Specimens:                 None           Findings:  Crusting ostiomeatal unit tickly on left splints in place removed nasal endoscopy revealed no purulence no unusual drainage the turbinates are healing properly septum was much more aligned as well as the nasal valve no other masses were seen in the pharynx on either side.  30° scope was used to evaluate the nose and ostiomeatal unit and then crusting was removed from suction and forceps    Complications: None    IMPLANTS:   [unfilled]    INDICATIONS: Postop crusting in the ostiomeatal unit status post 5 days  postop surgery    PROCEDURE:  Nose is decongested and lidocaine spray placed.  Splints removed a cutting suture moving in the endoscope was introduced and further lidocaine and Afrin place and suction crusting was removed and ostiomeatal unit tickly on the left some of the right no purulence was seen no unusual healing and no bleeding was noted on turbinates there is no unusual drainage.  She had no facial swelling is healing well his airways much improved she's happy with results as pain discomfort is decreasing  Discussed importance of continued irrigation calling us if not improving otherwise will recheck in 2 weeks.  Constipation regular pain medications I went ahead and gave him some additional Percocet explains use and side effects he had a very small amount.  Because his pain was more excess and expected probably because a splint against additional is warned about drowsiness.  He's can begin over-the-counter  anti-inflammatories to minimize the use.  Was warned about not driving.        This document has been electronically signed by Saturnino Chambers MD on April 11, 2018 8:53 AM

## 2018-04-25 ENCOUNTER — OFFICE VISIT (OUTPATIENT)
Dept: OTOLARYNGOLOGY | Facility: CLINIC | Age: 63
End: 2018-04-25

## 2018-04-25 VITALS — HEIGHT: 68 IN | TEMPERATURE: 96.1 F | WEIGHT: 167 LBS | BODY MASS INDEX: 25.31 KG/M2

## 2018-04-25 DIAGNOSIS — J32.2 CHRONIC ETHMOIDAL SINUSITIS: ICD-10-CM

## 2018-04-25 DIAGNOSIS — J34.3 NASAL TURBINATE HYPERTROPHY: Primary | ICD-10-CM

## 2018-04-25 PROCEDURE — 99024 POSTOP FOLLOW-UP VISIT: CPT | Performed by: OTOLARYNGOLOGY

## 2018-04-25 RX ORDER — CEFDINIR 300 MG/1
300 CAPSULE ORAL 2 TIMES DAILY
Qty: 20 CAPSULE | Refills: 0 | Status: SHIPPED | OUTPATIENT
Start: 2018-04-25 | End: 2018-05-09

## 2018-04-25 RX ORDER — PREDNISONE 10 MG/1
TABLET ORAL
Qty: 30 TABLET | Refills: 0 | Status: SHIPPED | OUTPATIENT
Start: 2018-04-25 | End: 2018-05-02

## 2018-04-25 NOTE — PATIENT INSTRUCTIONS

## 2018-04-25 NOTE — PROGRESS NOTES
Patient comes in saying is doing better now but last week Marianne congestion pressure no fever chills he didn't call her office.  Not having is much congestion pressure is not having any fever.  He begin irrigating use of peroxide which help loosen up his nose.  He says irrigated with saline regularly.  Examination reveals normal healing there are some crusting along the sutures nasal valve area.  The ostiomeatal unit is open and a suction evaluated at that with a 30° scope.  He tolerated it well with see him back in follow-up in 1 week because of the discomfort he was having minimal remove the sutures which I think will help explained him what little too soon to remove them now.  Given antibiotic aunt as well muscle strain help his pressure symptoms is some concern about frontal area is to call if not improving otherwise will recheck in 1 week.  KINJAL Chambers MD

## 2018-05-02 ENCOUNTER — OFFICE VISIT (OUTPATIENT)
Dept: OTOLARYNGOLOGY | Facility: CLINIC | Age: 63
End: 2018-05-02

## 2018-05-02 VITALS — BODY MASS INDEX: 26.52 KG/M2 | HEIGHT: 68 IN | WEIGHT: 175 LBS | TEMPERATURE: 98.5 F

## 2018-05-02 DIAGNOSIS — J32.0 CHRONIC MAXILLARY SINUSITIS: ICD-10-CM

## 2018-05-02 DIAGNOSIS — H69.91 EUSTACHIAN TUBE DISORDER, RIGHT: Primary | ICD-10-CM

## 2018-05-02 PROCEDURE — 69420 INCISION OF EARDRUM: CPT | Performed by: OTOLARYNGOLOGY

## 2018-05-02 RX ORDER — PREDNISONE 10 MG/1
TABLET ORAL
Qty: 30 TABLET | Refills: 0 | Status: SHIPPED | OUTPATIENT
Start: 2018-05-02 | End: 2018-05-23

## 2018-05-02 RX ORDER — ACETAMINOPHEN 500 MG
500 TABLET ORAL EVERY 6 HOURS PRN
COMMUNITY
End: 2018-07-12

## 2018-05-02 NOTE — PATIENT INSTRUCTIONS

## 2018-05-02 NOTE — PROGRESS NOTES
Patient comes in saying is swelling improved initially with steroids since his continue irrigating his have more pain and discomfortear decreased hearing.    Admission reveals nasal swelling to decrease I removed his sutures.  Ostiomeatal units opened no purulence seen is no swelling.  Examination of left ear reveals mild retraction in the right there is effusion.  Risk benefits and he elected having myringotomy and will use the steroids to help the swelling he also patient's antibiotics  Discussedrisks benefits of getting consent small amount of dilute phenol was placed on the eardrum the fluid was drained he tolerated it well he can easily hear better and felt relieved the pressure.  Over the procedure procedure without complication only had 2 mm myringotomy site with clear fluid obtained.   The ear dry and recheck in 1 week and take the medications  history frontal sinusitis or make sure he gets all the swelling down but endoscopically's much improvement doing better.  All questions are answered he is to call for questions or problems in the meantime.  KINJAL morgan MD

## 2018-05-09 ENCOUNTER — OFFICE VISIT (OUTPATIENT)
Dept: OTOLARYNGOLOGY | Facility: CLINIC | Age: 63
End: 2018-05-09

## 2018-05-09 VITALS — WEIGHT: 164 LBS | HEIGHT: 68 IN | BODY MASS INDEX: 24.86 KG/M2 | TEMPERATURE: 96.3 F

## 2018-05-09 DIAGNOSIS — H69.83 EUSTACHIAN TUBE DYSFUNCTION, BILATERAL: ICD-10-CM

## 2018-05-09 DIAGNOSIS — J32.0 CHRONIC MAXILLARY SINUSITIS: Primary | ICD-10-CM

## 2018-05-09 DIAGNOSIS — J32.1 CHRONIC FRONTAL SINUSITIS: ICD-10-CM

## 2018-05-09 PROCEDURE — 99024 POSTOP FOLLOW-UP VISIT: CPT | Performed by: OTOLARYNGOLOGY

## 2018-05-09 NOTE — PATIENT INSTRUCTIONS

## 2018-05-10 ENCOUNTER — HOSPITAL ENCOUNTER (OUTPATIENT)
Dept: CT IMAGING | Facility: HOSPITAL | Age: 63
Discharge: HOME | End: 2018-05-10
Admitting: OTOLARYNGOLOGY

## 2018-05-10 PROCEDURE — 70486 CT MAXILLOFACIAL W/O DYE: CPT

## 2018-05-10 NOTE — PROGRESS NOTES
Patient is complaining of ear symptoms are not as bad as they were in the improved remarkably when he had the myringotomy elected PE tubes placed because of the improvement he noted we recommend this deformity did not want to do that because of his work environment.  We also talked about the persistent discomfort is having of the frontal area.  I'm concerned about this and on nasal endoscopy and see no evidence of purulence or polyps.  Retina get a repeat CT to make sure he didn't have persistent sinusitis consist not a problem from the past.  The interim was scheduled PE tubes to be placed the office explained the risks benefits complications recovery he was gland with this  R MD Reyes

## 2018-05-11 ENCOUNTER — PROCEDURE VISIT (OUTPATIENT)
Dept: OTOLARYNGOLOGY | Facility: CLINIC | Age: 63
End: 2018-05-11

## 2018-05-11 VITALS — TEMPERATURE: 97.6 F | WEIGHT: 164.8 LBS | HEIGHT: 68 IN | BODY MASS INDEX: 24.98 KG/M2

## 2018-05-11 DIAGNOSIS — H66.91 CHRONIC OTITIS MEDIA OF RIGHT EAR: ICD-10-CM

## 2018-05-11 DIAGNOSIS — H69.83 EUSTACHIAN TUBE DYSFUNCTION, BILATERAL: ICD-10-CM

## 2018-05-11 PROCEDURE — 69433 CREATE EARDRUM OPENING: CPT | Performed by: OTOLARYNGOLOGY

## 2018-05-11 RX ORDER — FLUTICASONE PROPIONATE 50 MCG
2 SPRAY, SUSPENSION (ML) NASAL DAILY
COMMUNITY
End: 2018-06-20

## 2018-05-11 RX ORDER — AZELASTINE 1 MG/ML
2 SPRAY, METERED NASAL 2 TIMES DAILY
Qty: 30 ML | Refills: 12 | Status: SHIPPED | OUTPATIENT
Start: 2018-05-11 | End: 2018-10-17 | Stop reason: SDUPTHER

## 2018-05-11 NOTE — PATIENT INSTRUCTIONS

## 2018-05-11 NOTE — PROGRESS NOTES
OPERATIVE NOTE    Name:    Joseph Heard  YOB: 1955  Date of surgery:       Pre-op Diagnosis:     Chronic otitis media and eustachian tube dysfunction right worse than left  Post-op Diagnosis: Same  Procedure: Negative insertion of Rayo ventilation tubes      Surgeon:  Saturnino Chambers MD, AAOHNS    Anesthesia:  Topical phenol    Staff:    MERLIN Disla    Estimated Blood Loss: * none    Specimens:                none      Drains:  None    Findings:  Directed tympanic membrane to fluid right only    Complications: None    IMPLANTS:   Bilateral Rayo tubes     INDICATIONS: Medical therapy patient request after having undergone myringotomy have improvement is problems recurred after the myringotomy healed he wanted to do both ears of the right was worse than left    PROCEDURE: Consent was obtained and the timeout was carried out.  There is examined under microscope and anterior localization of phenol was placed incision made pillars Sais was suctioned and Rayo PE tube placed without difficulty.  The opposite side was done like fashion without complications patient tolerated it well            This document has been electronically signed by Saturnino Chambers MD on May 11, 2018 1:30 PM

## 2018-05-23 ENCOUNTER — OFFICE VISIT (OUTPATIENT)
Dept: OTOLARYNGOLOGY | Facility: CLINIC | Age: 63
End: 2018-05-23

## 2018-05-23 VITALS — TEMPERATURE: 96.8 F | HEIGHT: 68 IN | WEIGHT: 159 LBS | BODY MASS INDEX: 24.1 KG/M2

## 2018-05-23 DIAGNOSIS — H69.91 EUSTACHIAN TUBE DISORDER, RIGHT: Primary | ICD-10-CM

## 2018-05-23 PROCEDURE — 99024 POSTOP FOLLOW-UP VISIT: CPT | Performed by: OTOLARYNGOLOGY

## 2018-05-23 NOTE — PATIENT INSTRUCTIONS

## 2018-05-23 NOTE — PROGRESS NOTES
Subjective   Joseph Heard is a 62 y.o. male.     Status post PE tube placement  History of Present Illness     Says he is hearing better sounds still little bit down but not like they were he says trouble with voices which she's had trouble with not having drainage or pain in his ear she had some headache no frontal area over the weekend but none since then does not have any fever chills no swelling was face    The following portions of the patient's history were reviewed and updated as appropriate: allergies, current medications, past family history, past medical history, past social history, past surgical history and problem list.      Current Outpatient Prescriptions:   •  acetaminophen (TYLENOL) 500 MG tablet, Take 500 mg by mouth Every 6 (Six) Hours As Needed for Mild Pain ., Disp: , Rfl:   •  atorvastatin (LIPITOR) 10 MG tablet, Take 5 mg by mouth Every Night., Disp: , Rfl:   •  azelastine (ASTELIN) 0.1 % nasal spray, 2 sprays into each nostril 2 (Two) Times a Day. Use in each nostril as directed, Disp: 30 mL, Rfl: 12  •  cetirizine (zyrTEC) 10 MG tablet, Take 10 mg by mouth Every Night., Disp: , Rfl:   •  fluticasone (FLONASE) 50 MCG/ACT nasal spray, 2 sprays into each nostril Daily., Disp: , Rfl:   •  Multiple Vitamins-Minerals (COMPLETE MULTIVITAMIN/MINERAL PO), Take 1 tablet by mouth Daily., Disp: , Rfl:   •  vitamin C (ASCORBIC ACID) 500 MG tablet, Take 500 mg by mouth Every Night., Disp: , Rfl:   •  Mometasone Furoate 200 MCG/ACT aerosol, Inhale 2 application 2 (Two) Times a Day for 30 days., Disp: 13 g, Rfl: 3    No Known Allergies          Review of Systems        Objective   Physical Exam  Irrigation reveals ishealing well there is minimal sutures left no crusting noted no drainage.  He has no tenderness or swelling is external face her sinuses both tubes are in proper position and dry      Assessment/Plan   Joseph was seen today for follow-up.    Diagnoses and all orders for this  visit:    Eustachian tube disorder, right    Other orders  -     Mometasone Furoate 200 MCG/ACT aerosol; Inhale 2 application 2 (Two) Times a Day for 30 days.    Switching to different    Nasal steroid spray because he is significant allergy symptoms told him that this is normally uses oral form use in the nose trying to get better control of his allergy symptoms is swelling mainly does headaches his like his headaches are more weeks more congested.  Reviewed the CT findings with him talk to possible sinus surgery in particular balloon surgery for symptoms persist but will take a conservative approach there agree with this all questions answered will see him back in follow-up 3 weeks assuming headaches no any worse or recheck his hearing on follow-up all questions were answered

## 2018-06-20 ENCOUNTER — OFFICE VISIT (OUTPATIENT)
Dept: OTOLARYNGOLOGY | Facility: CLINIC | Age: 63
End: 2018-06-20

## 2018-06-20 VITALS — HEIGHT: 68 IN | WEIGHT: 168 LBS | TEMPERATURE: 97.6 F | BODY MASS INDEX: 25.46 KG/M2

## 2018-06-20 DIAGNOSIS — H69.91 EUSTACHIAN TUBE DISORDER, RIGHT: Primary | ICD-10-CM

## 2018-06-20 PROCEDURE — 99024 POSTOP FOLLOW-UP VISIT: CPT | Performed by: OTOLARYNGOLOGY

## 2018-06-20 RX ORDER — MONTELUKAST SODIUM 10 MG/1
10 TABLET ORAL DAILY
Qty: 30 TABLET | Refills: 5 | Status: SHIPPED | OUTPATIENT
Start: 2018-06-20 | End: 2018-10-17

## 2018-06-20 NOTE — PROGRESS NOTES
Patient is doing much better he says he does get some congestion particularly on the left side and ear feels stopped up sometimes in the morning.  His nasal sprays not having much drainage his headache is gone away.  Patient reveals no crusting airway much improved nasal endoscopy was done and noted evidence of purulence was found ostiomeatal units are wide open   and dry     We'll try a different approach to help with congestion symptoms he has a somewhat environmental.  He notes it's to close working we will recheck in a few weeks but in the meantime is to use Singulair and call us if not improving within 2 weeks   KINJAL Chambers M.D.

## 2018-07-12 ENCOUNTER — CLINICAL SUPPORT (OUTPATIENT)
Dept: AUDIOLOGY | Facility: CLINIC | Age: 63
End: 2018-07-12

## 2018-07-12 ENCOUNTER — OFFICE VISIT (OUTPATIENT)
Dept: OTOLARYNGOLOGY | Facility: CLINIC | Age: 63
End: 2018-07-12

## 2018-07-12 VITALS — WEIGHT: 166 LBS | TEMPERATURE: 97.4 F | HEIGHT: 68 IN | BODY MASS INDEX: 25.16 KG/M2

## 2018-07-12 DIAGNOSIS — H90.3 SENSORINEURAL HEARING LOSS (SNHL) OF BOTH EARS: ICD-10-CM

## 2018-07-12 DIAGNOSIS — H69.83 EUSTACHIAN TUBE DYSFUNCTION, BILATERAL: Primary | ICD-10-CM

## 2018-07-12 DIAGNOSIS — H90.3 SENSORINEURAL HEARING LOSS, BILATERAL: Primary | ICD-10-CM

## 2018-07-12 PROCEDURE — 99024 POSTOP FOLLOW-UP VISIT: CPT | Performed by: OTOLARYNGOLOGY

## 2018-07-12 RX ORDER — VITAMIN E 268 MG
400 CAPSULE ORAL DAILY
COMMUNITY
End: 2021-12-20

## 2018-07-12 RX ORDER — CHLORAL HYDRATE 500 MG
1200 CAPSULE ORAL
COMMUNITY
End: 2021-12-20

## 2018-07-12 NOTE — PROGRESS NOTES
STANDARD AUDIOMETRIC EVALUATION      Name:  Joseph Heard  :  1955  Age:  62 y.o.  Date of Evaluation:  2018      HISTORY    Reason for visit:  Joseph Heard is seen today for a hearing evaluation at the request of Dr. Saturnino Chambers.  Patient reports he got tubes in his ears, and he has popping in his ears.  He states he can't hear.      EVALUATION    See Audiogram    RESULTS        Otoscopy and Tympanometry 226 Hz :  Right Ear:  Otoscopy:  Clear ear canal, Visible PE tube          Tympanometry:  Large ear canal volume consistent with a patent PE tube    Left Ear:   Otoscopy:  Clear ear canal, Visible PE tube        Tympanometry:  Unable to test due to no seal    Test technique:  Standard Audiometry     Pure Tone Audiometry:   Patient responded to pure tones at 25-65 dB for 250-8000 Hz in right ear, and at 20-75 dB for 250-8000 Hz in left ear.       Speech Audiometry:        Right Ear:  Speech Reception Threshold (SRT) was obtained at 25 dBHL                 Speech Discrimination scores were 100% in quiet when words were presented at 65 dBHL       Left Ear:  Speech Reception Threshold (SRT) was obtained at 30 dBHL                 Speech Discrimination scores were 72% in quiet when words were presented at 70 dBHL    Reliability:   good    IMPRESSIONS:  1.  Tympanometry results are consistent with Large ear canal volume consistent with a patent PE tube in right ear, and Unable to test due to no seal in left ear.  2.  Pure tone results are consistent with mild to moderate sloping sensorineural hearing loss  for right ear, and normal to moderately severe sloping sensorineural hearing loss in left ear.       RECOMMENDATIONS:  Patient is seeing the Ear Nose and Throat physician immediately following this examination.  It was a pleasure seeing Joseph Heard in Audiology today.  We would be happy to do further testing or discuss these test as necessary.          This document has been  electronically signed by Luul Vincent MS CCC-A on July 12, 2018 11:45 AM       Lulu Vincent MS CCC-A  Licensed Audiologist

## 2018-07-12 NOTE — PATIENT INSTRUCTIONS

## 2018-07-12 NOTE — PROGRESS NOTES
Patient comes in follow-up because of hearing loss.  He's had chronic ear problems and eustachian tube dysfunction had tubes place.  Examination reveals tubes in position and dry consistent with tympanogram.  I am was reviewed with the patient showing hydroxy hearing loss consistent prior noise exposure.  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  Wears hearing protection regularly now but did not when he was involved with racing and other work.  Discussed hearing aids can consider that in the meantime we'll recheck in 3 months the sinus and moist and well has no headache no drainage is doing well.  He does find a saline sometimes causes irritation surgery.  That.  We'll see him back in 3 months to call for questions or problems.  KINJAL Chambers MD

## 2018-10-17 ENCOUNTER — OFFICE VISIT (OUTPATIENT)
Dept: OTOLARYNGOLOGY | Facility: CLINIC | Age: 63
End: 2018-10-17

## 2018-10-17 VITALS — WEIGHT: 171 LBS | HEIGHT: 68 IN | TEMPERATURE: 96 F | BODY MASS INDEX: 25.91 KG/M2

## 2018-10-17 DIAGNOSIS — M26.622 ARTHRALGIA OF LEFT TEMPOROMANDIBULAR JOINT: ICD-10-CM

## 2018-10-17 DIAGNOSIS — H69.83 EUSTACHIAN TUBE DYSFUNCTION, BILATERAL: Primary | ICD-10-CM

## 2018-10-17 DIAGNOSIS — J32.0 CHRONIC MAXILLARY SINUSITIS: ICD-10-CM

## 2018-10-17 DIAGNOSIS — H90.3 SENSORINEURAL HEARING LOSS (SNHL) OF BOTH EARS: ICD-10-CM

## 2018-10-17 PROCEDURE — 99213 OFFICE O/P EST LOW 20 MIN: CPT | Performed by: OTOLARYNGOLOGY

## 2018-10-17 RX ORDER — AZELASTINE 1 MG/ML
2 SPRAY, METERED NASAL 2 TIMES DAILY
Qty: 30 ML | Refills: 12 | Status: SHIPPED | OUTPATIENT
Start: 2018-10-17 | End: 2020-04-08 | Stop reason: SDUPTHER

## 2018-10-17 RX ORDER — FLUTICASONE PROPIONATE 50 MCG
SPRAY, SUSPENSION (ML) NASAL
COMMUNITY
Start: 2018-09-24 | End: 2018-10-17 | Stop reason: SDUPTHER

## 2018-10-17 RX ORDER — FLUTICASONE PROPIONATE 50 MCG
2 SPRAY, SUSPENSION (ML) NASAL DAILY
Qty: 16 G | Refills: 4 | Status: SHIPPED | OUTPATIENT
Start: 2018-10-17 | End: 2019-10-10 | Stop reason: SDUPTHER

## 2018-10-17 RX ORDER — MOMETASONE FUROATE 200 UG/1
200 AEROSOL RESPIRATORY (INHALATION) 2 TIMES DAILY
Qty: 13 G | Refills: 3 | Status: SHIPPED | OUTPATIENT
Start: 2018-10-17 | End: 2021-12-20

## 2018-10-17 RX ORDER — MOMETASONE FUROATE 200 UG/1
AEROSOL RESPIRATORY (INHALATION)
COMMUNITY
Start: 2018-08-07 | End: 2018-10-17 | Stop reason: SDUPTHER

## 2018-10-17 NOTE — PATIENT INSTRUCTIONS

## 2018-10-17 NOTE — PROGRESS NOTES
Subjective   Joseph Heard is a 63 y.o. male.   Follow-up ear and sinus    History of Present Illness   Is having jaw pain he had a fracture jaw on the left side relates causes ear and jaw pain does not have any ear drainage no change in hearing is interested in hearing aids and seeking of evaluation were that.  Is not having ear drainage soresbreathing better has occasional crusting and dried blood in the left side otherwise doing well not having headaches or facial pain currently      The following portions of the patient's history were reviewed and updated as appropriate: allergies, current medications, past family history, past medical history, past social history, past surgical history and problem list.      Current Outpatient Prescriptions:   •  ASMANEX  MCG/ACT aerosol, 200 mcg into the nostril(s) as directed by provider 2 (Two) Times a Day., Disp: 13 g, Rfl: 3  •  atorvastatin (LIPITOR) 10 MG tablet, Take 5 mg by mouth Every Night., Disp: , Rfl:   •  azelastine (ASTELIN) 0.1 % nasal spray, 2 sprays into the nostril(s) as directed by provider 2 (Two) Times a Day. Use in each nostril as directed, Disp: 30 mL, Rfl: 12  •  cetirizine (zyrTEC) 10 MG tablet, Take 10 mg by mouth Every Night., Disp: , Rfl:   •  fluticasone (FLONASE) 50 MCG/ACT nasal spray, 2 sprays into the nostril(s) as directed by provider Daily., Disp: 16 g, Rfl: 4  •  Multiple Vitamins-Minerals (COMPLETE MULTIVITAMIN/MINERAL PO), Take 1 tablet by mouth Daily., Disp: , Rfl:   •  Omega-3 Fatty Acids (FISH OIL) 1000 MG capsule capsule, Take 1,200 mg by mouth Daily With Breakfast. 1200 mg 3 days per week, Disp: , Rfl:   •  vitamin C (ASCORBIC ACID) 500 MG tablet, Take 500 mg by mouth Every Night., Disp: , Rfl:   •  vitamin E 400 UNIT capsule, Take 400 Units by mouth Daily. 400 units 3 times per week, Disp: , Rfl:     No Known Allergies          Review of Systems   Constitutional: Negative for chills and fever.   HENT: Positive for hearing  loss. Negative for ear discharge, ear pain and sore throat.    Neurological: Negative for facial asymmetry and headaches.   Hematological: Negative for adenopathy.           Objective   Physical Exam   Constitutional: He is oriented to person, place, and time. He appears well-developed and well-nourished.   HENT:   Head: Normocephalic and atraumatic.   Right Ear: Hearing, external ear and ear canal normal. Tympanic membrane is perforated. Tympanic membrane is not retracted. No middle ear effusion.   Left Ear: Hearing, external ear and ear canal normal. Tympanic membrane is perforated. Tympanic membrane is not retracted.  No middle ear effusion.   Nose: Mucosal edema, sinus tenderness and septal deviation present. No rhinorrhea or nasal deformity. No epistaxis. Right sinus exhibits no maxillary sinus tenderness and no frontal sinus tenderness. Left sinus exhibits no maxillary sinus tenderness and no frontal sinus tenderness.       Mouth/Throat: Uvula is midline and oropharynx is clear and moist. No trismus in the jaw. Normal dentition. No oropharyngeal exudate or posterior oropharyngeal edema.   Eyes: Conjunctivae are normal.   Neck: Normal range of motion. Neck supple. No JVD present. No tracheal deviation present. No thyromegaly present.   Cardiovascular: Normal rate.    Pulmonary/Chest: Effort normal.   Musculoskeletal: Normal range of motion.   Lymphadenopathy:        Head (right side): No submental, no submandibular, no tonsillar, no preauricular, no posterior auricular and no occipital adenopathy present.        Head (left side): No submental, no submandibular, no tonsillar, no preauricular, no posterior auricular and no occipital adenopathy present.     He has no cervical adenopathy.        Right cervical: No superficial cervical, no deep cervical and no posterior cervical adenopathy present.       Left cervical: No superficial cervical, no deep cervical and no posterior cervical adenopathy present.    Neurological: He is alert and oriented to person, place, and time. No cranial nerve deficit.   Skin: Skin is warm.   Psychiatric: He has a normal mood and affect. His speech is normal and behavior is normal. Thought content normal.   Nursing note and vitals reviewed.    Tender TMJ  PE tubes in place was minimal amount of wax      Assessment/Plan   Joseph was seen today for follow-up and ear problem.    Diagnoses and all orders for this visit:    Eustachian tube dysfunction, bilateral    Sensorineural hearing loss (SNHL) of both ears    Chronic maxillary sinusitis    Arthralgia of left temporomandibular joint    Other orders  -     fluticasone (FLONASE) 50 MCG/ACT nasal spray; 2 sprays into the nostril(s) as directed by provider Daily.  -     azelastine (ASTELIN) 0.1 % nasal spray; 2 sprays into the nostril(s) as directed by provider 2 (Two) Times a Day. Use in each nostril as directed  -     ASMANEX  MCG/ACT aerosol; 200 mcg into the nostril(s) as directed by provider 2 (Two) Times a Day.    OTCounter anti-inflammatories a shift to see his dentist mention that he will see the next week.    Ears dry continue nasal spray to minimize polyp formation recheck: Further nosebleeds plan to follow-up  March  Ifhe's having problems he's all to call us sooner    Hearing aid  evaluation in the meantime asked noise protection especially with his work

## 2018-12-27 ENCOUNTER — OFFICE VISIT (OUTPATIENT)
Dept: OTOLARYNGOLOGY | Facility: CLINIC | Age: 63
End: 2018-12-27

## 2018-12-27 VITALS — HEIGHT: 68 IN | BODY MASS INDEX: 25.61 KG/M2 | WEIGHT: 169 LBS | TEMPERATURE: 96 F

## 2018-12-27 DIAGNOSIS — H66.91 CHRONIC OTITIS MEDIA OF RIGHT EAR: Primary | ICD-10-CM

## 2018-12-27 PROCEDURE — 99213 OFFICE O/P EST LOW 20 MIN: CPT | Performed by: OTOLARYNGOLOGY

## 2018-12-27 RX ORDER — CIPROFLOXACIN AND DEXAMETHASONE 3; 1 MG/ML; MG/ML
4 SUSPENSION/ DROPS AURICULAR (OTIC) 2 TIMES DAILY
Qty: 7.5 ML | Refills: 0 | Status: SHIPPED | OUTPATIENT
Start: 2018-12-27 | End: 2018-12-28 | Stop reason: CLARIF

## 2018-12-27 NOTE — PATIENT INSTRUCTIONS

## 2018-12-27 NOTE — PROGRESS NOTES
Subjective   Joseph Heard is a 63 y.o. male.   Complains of ear problems on the left    History of Present Illness     She's had drainage pressure discomfort in the left ear and pain started last week he just called us today and is been gone for 5 days) peroxide in his ear but not antibiotic drops his like his hearing is down no fever chills    The following portions of the patient's history were reviewed and updated as appropriate: allergies, current medications, past family history, past medical history, past social history, past surgical history and problem list.      Current Outpatient Medications:   •  ASMANEX  MCG/ACT aerosol, 200 mcg into the nostril(s) as directed by provider 2 (Two) Times a Day., Disp: 13 g, Rfl: 3  •  atorvastatin (LIPITOR) 10 MG tablet, Take 5 mg by mouth Every Night., Disp: , Rfl:   •  azelastine (ASTELIN) 0.1 % nasal spray, 2 sprays into the nostril(s) as directed by provider 2 (Two) Times a Day. Use in each nostril as directed, Disp: 30 mL, Rfl: 12  •  cetirizine (zyrTEC) 10 MG tablet, Take 10 mg by mouth Every Night., Disp: , Rfl:   •  fluticasone (FLONASE) 50 MCG/ACT nasal spray, 2 sprays into the nostril(s) as directed by provider Daily., Disp: 16 g, Rfl: 4  •  Multiple Vitamins-Minerals (COMPLETE MULTIVITAMIN/MINERAL PO), Take 1 tablet by mouth Daily., Disp: , Rfl:   •  Omega-3 Fatty Acids (FISH OIL) 1000 MG capsule capsule, Take 1,200 mg by mouth Daily With Breakfast. 1200 mg 3 days per week, Disp: , Rfl:   •  vitamin C (ASCORBIC ACID) 500 MG tablet, Take 500 mg by mouth Every Night., Disp: , Rfl:   •  vitamin E 400 UNIT capsule, Take 400 Units by mouth Daily. 400 units 3 times per week, Disp: , Rfl:   •  ciprofloxacin-dexamethasone (CIPRODEX) 0.3-0.1 % otic suspension, Administer 4 drops into the left ear 2 (Two) Times a Day., Disp: 7.5 mL, Rfl: 0    No Known Allergies          Review of Systems   Constitutional: Negative for fever.   HENT: Positive for ear discharge,  hearing loss and tinnitus.    Hematological: Negative for adenopathy.           Objective   Physical Exam   Constitutional: He is oriented to person, place, and time. He appears well-developed and well-nourished.   HENT:   Head: Normocephalic and atraumatic.   Right Ear: Hearing, external ear and ear canal normal. Tympanic membrane is not perforated and not retracted. No middle ear effusion.   Left Ear: Hearing, external ear and ear canal normal. Tympanic membrane is not perforated and not retracted.  No middle ear effusion.   Ears:    Nose: No mucosal edema, rhinorrhea, sinus tenderness, nasal deformity or septal deviation. No epistaxis. Right sinus exhibits no maxillary sinus tenderness and no frontal sinus tenderness. Left sinus exhibits no maxillary sinus tenderness and no frontal sinus tenderness.   Mouth/Throat: Uvula is midline and oropharynx is clear and moist. No trismus in the jaw. Normal dentition. No oropharyngeal exudate or posterior oropharyngeal edema.   Eyes: Conjunctivae are normal.   Neck: Normal range of motion. Neck supple. No JVD present. No tracheal deviation present. No thyromegaly present.   Cardiovascular: Normal rate.   Pulmonary/Chest: Effort normal.   Musculoskeletal: Normal range of motion.   Lymphadenopathy:        Head (right side): No submental, no submandibular, no tonsillar, no preauricular, no posterior auricular and no occipital adenopathy present.        Head (left side): No submental, no submandibular, no tonsillar, no preauricular, no posterior auricular and no occipital adenopathy present.     He has no cervical adenopathy.        Right cervical: No superficial cervical, no deep cervical and no posterior cervical adenopathy present.       Left cervical: No superficial cervical, no deep cervical and no posterior cervical adenopathy present.   Neurological: He is alert and oriented to person, place, and time. No cranial nerve deficit.   Skin: Skin is warm.   Psychiatric: He has  a normal mood and affect. His speech is normal and behavior is normal. Thought content normal.   Nursing note and vitals reviewed.    Ear was thoroughly cleaned and some granulation tissue removed patient tolerated it well systemic to the microscope and suction      Assessment/Plan   Joseph was seen today for ear problem.    Diagnoses and all orders for this visit:    Chronic otitis media of right ear    Other orders  -     ciprofloxacin-dexamethasone (CIPRODEX) 0.3-0.1 % otic suspension; Administer 4 drops into the left ear 2 (Two) Times a Day.    keep ear dry   Ciprodex prescribed call if no better in 4-5 days otherwise recheck in 2 weeks consider changing tube or removal of tube patient continues to have problems.

## 2018-12-28 RX ORDER — TOBRAMYCIN AND DEXAMETHASONE 3; 1 MG/ML; MG/ML
SUSPENSION/ DROPS OPHTHALMIC
Qty: 10 ML | Refills: 0 | Status: SHIPPED | OUTPATIENT
Start: 2018-12-28 | End: 2019-05-08

## 2019-01-09 ENCOUNTER — CLINICAL SUPPORT (OUTPATIENT)
Dept: AUDIOLOGY | Facility: CLINIC | Age: 64
End: 2019-01-09

## 2019-01-09 ENCOUNTER — OFFICE VISIT (OUTPATIENT)
Dept: OTOLARYNGOLOGY | Facility: CLINIC | Age: 64
End: 2019-01-09

## 2019-01-09 VITALS — BODY MASS INDEX: 25.61 KG/M2 | TEMPERATURE: 98.2 F | WEIGHT: 169 LBS | HEIGHT: 68 IN

## 2019-01-09 DIAGNOSIS — H90.3 SENSORINEURAL HEARING LOSS (SNHL) OF BOTH EARS: ICD-10-CM

## 2019-01-09 DIAGNOSIS — H69.83 EUSTACHIAN TUBE DYSFUNCTION, BILATERAL: Primary | ICD-10-CM

## 2019-01-09 DIAGNOSIS — H90.3 SENSORINEURAL HEARING LOSS, BILATERAL: Primary | ICD-10-CM

## 2019-01-09 PROCEDURE — 99212 OFFICE O/P EST SF 10 MIN: CPT | Performed by: OTOLARYNGOLOGY

## 2019-01-09 NOTE — PROGRESS NOTES
STANDARD AUDIOMETRIC EVALUATION      Name:  Joseph Heard  :  1955  Age:  63 y.o.  Date of Evaluation:  2019      HISTORY    Reason for visit:  Joseph Heard is seen today for a hearing evaluation at the request of Dr. Saturnino Chambers.  Patient reports he has a history of sinus infections and tubes in his ears.  He states he has had decreased hearing in his left ear, and his left ear pops and crackles and feels stuffed up.  He reports a history of drainage from his ear.      EVALUATION    See Audiogram    RESULTS        Otoscopy and Tympanometry 226 Hz :  Right Ear:  Otoscopy:  Testing completed after ears were examined by the ENT physician          Tympanometry:  Did not test due to not requested by doctor    Left Ear:   Otoscopy:  Testing completed after ears were examined by the ENT physician        Tympanometry:  Did not test due to not requested by doctor    Test technique:  Standard Audiometry     Pure Tone Audiometry:   Patient responded to pure tones at 25-65 dB for 250-8000 Hz in right ear, and at 30-65 dB for 250-8000 Hz in left ear.       Speech Audiometry:        Right Ear:  Speech Reception Threshold (SRT) was obtained at 25 dBHL                 Speech Discrimination scores were 96% in quiet when words were presented at 65 dBHL       Left Ear:  Speech Reception Threshold (SRT) was obtained at 30 dBHL                 Speech Discrimination scores were 96% in quiet when words were presented at 70 dBHL    Reliability:   good    IMPRESSIONS:  1.  Tympanometry results are consistent with Did not test due to not requested by doctor in both ears.  2.  Pure tone results are consistent with mild to moderate sloping sensorineural hearing loss  for both ears.       RECOMMENDATIONS:  Patient is seeing the Ear Nose and Throat physician immediately following this examination.  It was a pleasure seeing Joseph Heard in Audiology today.  We would be happy to do further testing or discuss these  test as necessary.          This document has been electronically signed by Lulu Vincent MS CCC-A on January 9, 2019 4:00 PM       Lulu Vincent MS CCC-A  Licensed Audiologist

## 2019-01-09 NOTE — PATIENT INSTRUCTIONS

## 2019-01-09 NOTE — PROGRESS NOTES
Subjective   Joseph Heard is a 63 y.o. male.     Follow-up eustachian tube problems  History of Present Illness   The patient states his ear drainage and hearing somewhat better though still has some hearing loss more than the left he's had some drainage in his ear but much less than had been he stopped using eardrops  Vertigo    The following portions of the patient's history were reviewed and updated as appropriate: allergies, current medications, past family history, past medical history, past social history, past surgical history and problem list.      Current Outpatient Medications:   •  ASMANEX  MCG/ACT aerosol, 200 mcg into the nostril(s) as directed by provider 2 (Two) Times a Day., Disp: 13 g, Rfl: 3  •  atorvastatin (LIPITOR) 10 MG tablet, Take 5 mg by mouth Every Night., Disp: , Rfl:   •  azelastine (ASTELIN) 0.1 % nasal spray, 2 sprays into the nostril(s) as directed by provider 2 (Two) Times a Day. Use in each nostril as directed, Disp: 30 mL, Rfl: 12  •  cetirizine (zyrTEC) 10 MG tablet, Take 10 mg by mouth Every Night., Disp: , Rfl:   •  fluticasone (FLONASE) 50 MCG/ACT nasal spray, 2 sprays into the nostril(s) as directed by provider Daily., Disp: 16 g, Rfl: 4  •  Multiple Vitamins-Minerals (COMPLETE MULTIVITAMIN/MINERAL PO), Take 1 tablet by mouth Daily., Disp: , Rfl:   •  Omega-3 Fatty Acids (FISH OIL) 1000 MG capsule capsule, Take 1,200 mg by mouth Daily With Breakfast. 1200 mg 3 days per week, Disp: , Rfl:   •  tobramycin-dexamethasone (TOBRADEX) 0.3-0.1 % ophthalmic suspension, 4 drops bid affectedear for 5 days, Disp: 10 mL, Rfl: 0  •  vitamin C (ASCORBIC ACID) 500 MG tablet, Take 500 mg by mouth Every Night., Disp: , Rfl:   •  vitamin E 400 UNIT capsule, Take 400 Units by mouth Daily. 400 units 3 times per week, Disp: , Rfl:     No Known Allergies          Review of Systems   Constitutional: Negative for fever.   HENT: Positive for ear discharge and hearing loss. Negative for ear  pain.    Neurological: Negative for dizziness.           Objective   Physical Exam   Constitutional: He appears well-developed and well-nourished.   HENT:   Head: Normocephalic.   Right Ear: External ear and ear canal normal.   Left Ear: External ear and ear canal normal.   Ears:    Nose: Nose normal.   Mouth/Throat: Uvula is midline and oropharynx is clear and moist.   Neck: Normal range of motion.   Pulmonary/Chest: Effort normal.     Audiogram was reviewed with the patient showing bilateral hearing loss slightly worse left and right with similar to previous hearing test      Assessment/Plan   Joseph was seen today for follow-up.    Diagnoses and all orders for this visit:    Eustachian tube dysfunction, bilateral    Sensorineural hearing loss (SNHL) of both ears    Stop nasal irrigation  Stop eardrops  She has not been using hearing protection regularly discussed the critical need for that.  He is to cause next week and I given a phone card to let us know how he is doing think the problem is resolving as does he based on his symptoms I see no some infection or fluid is to call if he has persistent drainage we talked about removal of the tube and/or using a larger to all questions were answered

## 2019-01-17 ENCOUNTER — TELEPHONE (OUTPATIENT)
Dept: OTOLARYNGOLOGY | Facility: CLINIC | Age: 64
End: 2019-01-17

## 2019-01-17 RX ORDER — CEFDINIR 300 MG/1
300 CAPSULE ORAL 2 TIMES DAILY
Qty: 42 CAPSULE | Refills: 0 | Status: SHIPPED | OUTPATIENT
Start: 2019-01-17 | End: 2019-05-08

## 2019-01-17 NOTE — TELEPHONE ENCOUNTER
Per Dr. Chambers, patient was notified that a new antibiotic was sent to his pharmacy.  Asked him to take it with food and take probiotics.  A follow up appointment has been scheduled.  Told patient that if this antibiotic does not work at his next apppointment they  Will discuss removal of the ear tube.            ----- Message from Stefany White sent at 1/17/2019 10:54 AM CST -----  Contact: [phone]  Was to let you know how he is feeling... His left ear has pressure and drains every night.

## 2019-02-06 ENCOUNTER — OFFICE VISIT (OUTPATIENT)
Dept: OTOLARYNGOLOGY | Facility: CLINIC | Age: 64
End: 2019-02-06

## 2019-02-06 VITALS — TEMPERATURE: 97.9 F | BODY MASS INDEX: 25.91 KG/M2 | WEIGHT: 171 LBS | HEIGHT: 68 IN

## 2019-02-06 DIAGNOSIS — H69.83 EUSTACHIAN TUBE DYSFUNCTION, BILATERAL: Primary | ICD-10-CM

## 2019-02-06 PROCEDURE — 99212 OFFICE O/P EST SF 10 MIN: CPT | Performed by: OTOLARYNGOLOGY

## 2019-02-06 NOTE — PROGRESS NOTES
Subjective   Joseph Heard is a 63 y.o. male.   F/u ear and sinus problems  History of Present Illness     She notes that her drainage is finally stopped he is breathing well through his nose not any sinus pressure no unusual bleeding was noted from the nose and he has occasional popping the ear but the pressure and drainage is much improved    The following portions of the patient's history were reviewed and updated as appropriate: allergies, current medications, past family history, past medical history, past social history, past surgical history and problem list.      Joseph Heard reports that  has never smoked. he has never used smokeless tobacco. He reports that he drinks alcohol. He reports that he does not use drugs.  Patient is not a tobacco user and has been counseled for use of tobacco products    Family History   Problem Relation Age of Onset   • Heart disease Mother    • Thyroid disease Mother    • Heart disease Father          Current Outpatient Medications:   •  ASMANEX  MCG/ACT aerosol, 200 mcg into the nostril(s) as directed by provider 2 (Two) Times a Day., Disp: 13 g, Rfl: 3  •  atorvastatin (LIPITOR) 10 MG tablet, Take 5 mg by mouth Every Night., Disp: , Rfl:   •  azelastine (ASTELIN) 0.1 % nasal spray, 2 sprays into the nostril(s) as directed by provider 2 (Two) Times a Day. Use in each nostril as directed, Disp: 30 mL, Rfl: 12  •  cefdinir (OMNICEF) 300 MG capsule, Take 1 capsule by mouth 2 (Two) Times a Day. Take with food and probiotics and call office and stop if develop watery diarrhea., Disp: 42 capsule, Rfl: 0  •  cetirizine (zyrTEC) 10 MG tablet, Take 10 mg by mouth Every Night., Disp: , Rfl:   •  fluticasone (FLONASE) 50 MCG/ACT nasal spray, 2 sprays into the nostril(s) as directed by provider Daily., Disp: 16 g, Rfl: 4  •  Multiple Vitamins-Minerals (COMPLETE MULTIVITAMIN/MINERAL PO), Take 1 tablet by mouth Daily., Disp: , Rfl:   •  Omega-3 Fatty Acids (FISH OIL) 1000 MG  capsule capsule, Take 1,200 mg by mouth Daily With Breakfast. 1200 mg 3 days per week, Disp: , Rfl:   •  tobramycin-dexamethasone (TOBRADEX) 0.3-0.1 % ophthalmic suspension, 4 drops bid affectedear for 5 days, Disp: 10 mL, Rfl: 0  •  vitamin C (ASCORBIC ACID) 500 MG tablet, Take 500 mg by mouth Every Night., Disp: , Rfl:   •  vitamin E 400 UNIT capsule, Take 400 Units by mouth Daily. 400 units 3 times per week, Disp: , Rfl:     No Known Allergies    Past Medical History:   Diagnosis Date   • Head ache    • Hyperlipemia          Review of Systems   Constitutional: Negative for fever.   HENT: Positive for hearing loss. Negative for ear discharge and ear pain.    Hematological: Negative for adenopathy.           Objective   Physical Exam   Constitutional: He appears well-developed and well-nourished.   HENT:   Head: Normocephalic.   Right Ear: External ear and ear canal normal.   Left Ear: External ear and ear canal normal.   Ears:    Nose: Nose normal.   Mouth/Throat: Uvula is midline and oropharynx is clear and moist.   Neck: Normal range of motion.   Pulmonary/Chest: Effort normal.     Nose is wide open with no crusting    The tubes are dry and in place and patent        Assessment/Plan   Joseph was seen today for ear problem.    Diagnoses and all orders for this visit:    Eustachian tube dysfunction, bilateral    He is to call if any questions or problems he has additional drops to use as as needed.    I discussed at length with him the importance of checking his hearing as he works around loud noise to prevent further nerve hearing loss   we will see him in 3 months unless he has new problems or questions

## 2019-05-08 ENCOUNTER — OFFICE VISIT (OUTPATIENT)
Dept: OTOLARYNGOLOGY | Facility: CLINIC | Age: 64
End: 2019-05-08

## 2019-05-08 VITALS — WEIGHT: 167 LBS | TEMPERATURE: 97.8 F | HEIGHT: 68 IN | BODY MASS INDEX: 25.31 KG/M2

## 2019-05-08 DIAGNOSIS — H69.83 EUSTACHIAN TUBE DYSFUNCTION, BILATERAL: ICD-10-CM

## 2019-05-08 DIAGNOSIS — T78.40XS ALLERGIC DISORDER, SEQUELA: ICD-10-CM

## 2019-05-08 DIAGNOSIS — M26.622 ARTHRALGIA OF LEFT TEMPOROMANDIBULAR JOINT: ICD-10-CM

## 2019-05-08 DIAGNOSIS — H90.3 SENSORINEURAL HEARING LOSS (SNHL) OF BOTH EARS: Primary | ICD-10-CM

## 2019-05-08 PROCEDURE — 99213 OFFICE O/P EST LOW 20 MIN: CPT | Performed by: OTOLARYNGOLOGY

## 2019-05-08 RX ORDER — NAPROXEN SODIUM 220 MG
220 TABLET ORAL NIGHTLY
Qty: 60 TABLET | Refills: 6 | Status: ON HOLD | OUTPATIENT
Start: 2019-05-08 | End: 2020-05-29

## 2019-05-08 NOTE — PATIENT INSTRUCTIONS

## 2019-05-08 NOTE — PROGRESS NOTES
Subjective   Joseph Heard is a 63 y.o. male.   Follow-up of ears and sinus    History of Present Illness     No drainage no change in hearing says he is not consistently using his hearing protection it was recommended he has not had any major problems except for occasional pain especially at night goes into the day starts at night when he lays on the left side of his ear consistent with prior TMJs not have any ear drainage no change in hearing says other than some postnasal drip which is not real bothersome is not having facial pain is breathing well through his nose he is using his allergy sprays and does not want to consider allergy testing at this point and shots    The following portions of the patient's history were reviewed and updated as appropriate: allergies, current medications, past family history, past medical history, past social history, past surgical history and problem list.      Current Outpatient Medications:   •  ASMANEX  MCG/ACT aerosol, 200 mcg into the nostril(s) as directed by provider 2 (Two) Times a Day., Disp: 13 g, Rfl: 3  •  atorvastatin (LIPITOR) 10 MG tablet, Take 5 mg by mouth Every Night., Disp: , Rfl:   •  azelastine (ASTELIN) 0.1 % nasal spray, 2 sprays into the nostril(s) as directed by provider 2 (Two) Times a Day. Use in each nostril as directed, Disp: 30 mL, Rfl: 12  •  cetirizine (zyrTEC) 10 MG tablet, Take 10 mg by mouth Every Night., Disp: , Rfl:   •  fluticasone (FLONASE) 50 MCG/ACT nasal spray, 2 sprays into the nostril(s) as directed by provider Daily., Disp: 16 g, Rfl: 4  •  Multiple Vitamins-Minerals (COMPLETE MULTIVITAMIN/MINERAL PO), Take 1 tablet by mouth Daily., Disp: , Rfl:   •  Omega-3 Fatty Acids (FISH OIL) 1000 MG capsule capsule, Take 1,200 mg by mouth Daily With Breakfast. 1200 mg 3 days per week, Disp: , Rfl:   •  vitamin C (ASCORBIC ACID) 500 MG tablet, Take 500 mg by mouth Every Night., Disp: , Rfl:   •  vitamin E 400 UNIT capsule, Take 400 Units  by mouth Daily. 400 units 3 times per week, Disp: , Rfl:   •  naproxen sodium (ALEVE) 220 MG tablet, Take 1 tablet by mouth Every Night., Disp: 60 tablet, Rfl: 6    No Known Allergies          Review of Systems   Constitutional: Negative for fever.   HENT: Positive for ear pain. Negative for congestion, ear discharge, sinus pressure and trouble swallowing.    Hematological: Negative for adenopathy.           Objective   Physical Exam   Constitutional: He appears well-developed and well-nourished.   HENT:   Head: Normocephalic.   Right Ear: External ear and ear canal normal.   Left Ear: External ear and ear canal normal.   Ears:    Nose: Nose normal.   Mouth/Throat: Uvula is midline and oropharynx is clear and moist.   Eyes: Conjunctivae are normal.   Neck: Normal range of motion.   Pulmonary/Chest: Effort normal.           Assessment/Plan   Joseph was seen today for follow-up.    Diagnoses and all orders for this visit:    Sensorineural hearing loss (SNHL) of both ears    Eustachian tube dysfunction, bilateral    Arthralgia of left temporomandibular joint    Allergic disorder, sequela    Other orders  -     naproxen sodium (ALEVE) 220 MG tablet; Take 1 tablet by mouth Every Night.    Tested Naprosyn at night with food call if not improving 2 weeks do not use twice daily just once a day    Test keep the ears dry discussed hearing protection    We will need audio on follow-up is to call to questions and plan is breathing better not in sinus problems he had before though he does have postnasal drip continue his nasal sprays he needs a refill also give us a call

## 2019-09-11 ENCOUNTER — CLINICAL SUPPORT (OUTPATIENT)
Dept: AUDIOLOGY | Facility: CLINIC | Age: 64
End: 2019-09-11

## 2019-09-11 ENCOUNTER — OFFICE VISIT (OUTPATIENT)
Dept: OTOLARYNGOLOGY | Facility: CLINIC | Age: 64
End: 2019-09-11

## 2019-09-11 VITALS — WEIGHT: 167 LBS | TEMPERATURE: 97.5 F | BODY MASS INDEX: 25.31 KG/M2 | HEIGHT: 68 IN

## 2019-09-11 DIAGNOSIS — H90.3 SENSORINEURAL HEARING LOSS (SNHL) OF BOTH EARS: Primary | ICD-10-CM

## 2019-09-11 DIAGNOSIS — H69.83 EUSTACHIAN TUBE DYSFUNCTION, BILATERAL: ICD-10-CM

## 2019-09-11 DIAGNOSIS — H93.13 TINNITUS OF BOTH EARS: ICD-10-CM

## 2019-09-11 DIAGNOSIS — J30.1 SEASONAL ALLERGIC RHINITIS DUE TO POLLEN: ICD-10-CM

## 2019-09-11 DIAGNOSIS — J30.0 VMR (VASOMOTOR RHINITIS): ICD-10-CM

## 2019-09-11 DIAGNOSIS — H90.3 SENSORINEURAL HEARING LOSS, BILATERAL: Primary | ICD-10-CM

## 2019-09-11 PROCEDURE — 99213 OFFICE O/P EST LOW 20 MIN: CPT | Performed by: OTOLARYNGOLOGY

## 2019-09-11 RX ORDER — PREDNISONE 10 MG/1
TABLET ORAL
Qty: 30 TABLET | Refills: 0 | Status: ON HOLD | OUTPATIENT
Start: 2019-09-11 | End: 2020-05-29

## 2019-09-11 RX ORDER — IPRATROPIUM BROMIDE 42 UG/1
2 SPRAY, METERED NASAL 3 TIMES DAILY
Qty: 15 ML | Refills: 5 | Status: SHIPPED | OUTPATIENT
Start: 2019-09-11 | End: 2020-04-08 | Stop reason: SDUPTHER

## 2019-09-11 NOTE — PATIENT INSTRUCTIONS

## 2019-09-11 NOTE — PROGRESS NOTES
Subjective   Joseph Heard is a 63 y.o. male.   Follow-up for ears    History of Present Illness   Has some mild sinus pressure usually worse in the morning mainly complains of chronic drainage out of his nose certain activities tend tend to bring on including eating certain outdoor activities  Has occasional pressure and discomfort in his left ear when he leans back probably related to TMJ  Interested in seeing an allergist    He had some pop in his ear that resolved on the right still has tinnitus and hearing loss is not interested in hearing aids      The following portions of the patient's history were reviewed and updated as appropriate: allergies, current medications, past family history, past medical history, past social history, past surgical history and problem list.      Current Outpatient Medications:   •  ASMANEX  MCG/ACT aerosol, 200 mcg into the nostril(s) as directed by provider 2 (Two) Times a Day., Disp: 13 g, Rfl: 3  •  atorvastatin (LIPITOR) 10 MG tablet, Take 5 mg by mouth Every Night., Disp: , Rfl:   •  azelastine (ASTELIN) 0.1 % nasal spray, 2 sprays into the nostril(s) as directed by provider 2 (Two) Times a Day. Use in each nostril as directed, Disp: 30 mL, Rfl: 12  •  cetirizine (zyrTEC) 10 MG tablet, Take 10 mg by mouth Every Night., Disp: , Rfl:   •  fluticasone (FLONASE) 50 MCG/ACT nasal spray, 2 sprays into the nostril(s) as directed by provider Daily., Disp: 16 g, Rfl: 4  •  Multiple Vitamins-Minerals (COMPLETE MULTIVITAMIN/MINERAL PO), Take 1 tablet by mouth Daily., Disp: , Rfl:   •  naproxen sodium (ALEVE) 220 MG tablet, Take 1 tablet by mouth Every Night., Disp: 60 tablet, Rfl: 6  •  Omega-3 Fatty Acids (FISH OIL) 1000 MG capsule capsule, Take 1,200 mg by mouth Daily With Breakfast. 1200 mg 3 days per week, Disp: , Rfl:   •  vitamin C (ASCORBIC ACID) 500 MG tablet, Take 500 mg by mouth Every Night., Disp: , Rfl:   •  vitamin E 400 UNIT capsule, Take 400 Units by mouth  Daily. 400 units 3 times per week, Disp: , Rfl:   •  ipratropium (ATROVENT) 0.06 % nasal spray, 2 sprays into the nostril(s) as directed by provider 3 (Three) Times a Day., Disp: 15 mL, Rfl: 5  •  predniSONE (DELTASONE) 10 MG tablet, Daily dose: 5 tabs for 2 days, then 4 tabs for 2 days, then 3 tabs for 2 days, then 2 tabs for 2 days, then 1 tab for 2 days, Disp: 30 tablet, Rfl: 0    No Known Allergies          Review of Systems   Constitutional: Negative for fever.   HENT: Positive for hearing loss, sinus pressure and tinnitus. Negative for ear pain.    Neurological: Negative for dizziness and facial asymmetry.   Hematological: Negative for adenopathy.           Objective   Physical Exam   Constitutional: He appears well-developed and well-nourished.   HENT:   Head: Normocephalic.   Right Ear: External ear and ear canal normal.   Left Ear: External ear and ear canal normal.   Ears:    Nose: Nose normal.   Mouth/Throat: Uvula is midline and oropharynx is clear and moist.   Eyes: Conjunctivae are normal.   Neck: Normal range of motion.   Pulmonary/Chest: Effort normal.     Audiogram was reviewed showing improvement on the left side right side is slightly decreased wax is not been removed at that point review the actual tracings with the patient      Assessment/Plan   Joseph was seen today for follow-up.    Diagnoses and all orders for this visit:    Sensorineural hearing loss (SNHL) of both ears    Eustachian tube dysfunction, bilateral    VMR (vasomotor rhinitis)    Seasonal allergic rhinitis due to pollen  -     Ambulatory Referral to Allergy    Other orders  -     ipratropium (ATROVENT) 0.06 % nasal spray; 2 sprays into the nostril(s) as directed by provider 3 (Three) Times a Day.  -     predniSONE (DELTASONE) 10 MG tablet; Daily dose: 5 tabs for 2 days, then 4 tabs for 2 days, then 3 tabs for 2 days, then 2 tabs for 2 days, then 1 tab for 2 days    This I placed on Atrovent explained use and side effects  She is  going to self arrange a hearing aid evaluation he does not want to do that here at this time  I am arranging allergy referral to Dr. Donohue because of his allergy symptoms are still bothersome despite the sprays given prednisone trial he is tolerated well in the past help the pressure  Suggested seeing dentist about the TMJ or seeing someone about his neck  On exam of his nose I see no purulence or no polyp recurrence not improving consider CT he is to call us to let me know otherwise we will see him back in 3 months recheck his ear pressure and see if he is feeling back up with fluid on the right we will could consider long-term treatment but hopefully that will be necessary

## 2019-09-11 NOTE — PROGRESS NOTES
STANDARD AUDIOMETRIC EVALUATION      Name:  Joseph Heard  :  1955  Age:  63 y.o.  Date of Evaluation:  2019      HISTORY    Reason for visit:  Joseph Heard was seen today for a hearing evaluation at the request of Dr. Saturnino Chambers.   He has a history of bilateral hearing loss and eustachian tube dysfunction which required PE tube placement approximately one year ago. Mr. Heard reported that he still has persistent popping in his ears accompanied by ear aches and sinus headaches. He stated that he has a high pitched ringing tinnitus that fluctuates in intensity. He has noted that when his sinuses get better the tinnitus seems to decrease in intensity. Mr. Heard was unsure if his PE tubes were still in place. He has noted a decrease in hearing sensitivity. No other significant audiologic information was reported.      EVALUATION    See Audiogram    RESULTS        Otoscopy and Tympanometry 226 Hz :  Right Ear:  Otoscopy:  Visible PE tube          Tympanometry:  Negative middle ear pressure    Left Ear:   Otoscopy:  Visible PE tube        Tympanometry:  Large ear canal volume consistent with a patent PE tube    Test technique:  Standard Audiometry     Pure Tone Audiometry:   Patient responded to pure tones at 30-75 dB for 250-8000 Hz in right ear, and at 25-75 dB for 250-8000 Hz in left ear.       Speech Audiometry:        Right Ear:  Speech Reception Threshold (SRT) was obtained at 35 dBHL                 Speech Discrimination scores were 100% in quiet when words were presented at 70 dBHL, the patient's MCL         Left Ear:  Speech Reception Threshold (SRT) was obtained at 25 dBHL                 Speech Discrimination scores were 80% in quiet when words were presented at 65 dBHL    Reliability:   good    IMPRESSIONS:  1.  Tympanometry results are consistent with Negative middle ear pressure in right ear, and Large ear canal volume consistent with a patent PE tube in left ear.  2.  Pure tone  results are consistent with a mild sloping to severe primarily sensorineural hearing loss bilaterally. A conductive component was noted at 1000 Hz for the right ear. Asymmetries were noted at 2000 Hz with the left ear being the poorer ear and at 6000 Hz with the right ear being the poorer ear.      RECOMMENDATIONS:  Patient is seeing the Ear Nose and Throat physician immediately following this examination.  It was a pleasure seeing Joseph Heard in Audiology today.  We would be happy to do further testing or discuss these test as necessary.              This document has been electronically signed by LENNIE Mcconnell on September 11, 2019 9:14 AM

## 2019-10-10 RX ORDER — FLUTICASONE PROPIONATE 50 MCG
SPRAY, SUSPENSION (ML) NASAL
Qty: 1 BOTTLE | Refills: 3 | Status: SHIPPED | OUTPATIENT
Start: 2019-10-10 | End: 2020-06-24

## 2020-03-24 ENCOUNTER — TELEPHONE (OUTPATIENT)
Dept: OTOLARYNGOLOGY | Facility: CLINIC | Age: 65
End: 2020-03-24

## 2020-03-24 NOTE — TELEPHONE ENCOUNTER
"----- Message from Saturnino Chambers MD sent at 3/24/2020 12:57 PM CDT -----  Contact: [phone]  What does cough have to do with the ear?   Needs to see suzanne larios provider regarding that first and we can see him for ear 11:15 Thur , needs audio also  ----- Message -----  From: Stefany White  Sent: 3/24/2020  11:11 AM CDT  To: Bria Mathews, Dahlia Yo, [facility], #    Has been sick for 5 weeks with raspatory \"cold\" wants to be worked in for hearing loss.     "

## 2020-03-24 NOTE — TELEPHONE ENCOUNTER
03/24/2020, 1335 - Patient telephoned per this staff member [phone].  Zero answer.  Voice message submitted with date, time, office contact information, and request to contact office at earliest convenience.    Note - Per Dr. Saturnino Chambers M.D./ENT, patient to see Primary Care Physician first, then patient may be scheduled for clinical appointment on Thursday, March 26, 2020 at 11:15 A.M. with Audiology.

## 2020-03-25 ENCOUNTER — TELEPHONE (OUTPATIENT)
Dept: OTOLARYNGOLOGY | Facility: CLINIC | Age: 65
End: 2020-03-25

## 2020-03-25 ENCOUNTER — OFFICE VISIT (OUTPATIENT)
Dept: OTOLARYNGOLOGY | Facility: CLINIC | Age: 65
End: 2020-03-25

## 2020-03-25 VITALS — WEIGHT: 165 LBS | HEIGHT: 68 IN | TEMPERATURE: 98.6 F | BODY MASS INDEX: 25.01 KG/M2

## 2020-03-25 DIAGNOSIS — H69.83 EUSTACHIAN TUBE DYSFUNCTION, BILATERAL: Primary | ICD-10-CM

## 2020-03-25 DIAGNOSIS — H90.3 SENSORINEURAL HEARING LOSS (SNHL) OF BOTH EARS: ICD-10-CM

## 2020-03-25 DIAGNOSIS — J30.1 SEASONAL ALLERGIC RHINITIS DUE TO POLLEN: ICD-10-CM

## 2020-03-25 PROCEDURE — 99213 OFFICE O/P EST LOW 20 MIN: CPT | Performed by: OTOLARYNGOLOGY

## 2020-03-25 RX ORDER — PREDNISONE 10 MG/1
TABLET ORAL
Qty: 30 TABLET | Refills: 0 | Status: ON HOLD | OUTPATIENT
Start: 2020-03-25 | End: 2020-05-29

## 2020-03-25 NOTE — TELEPHONE ENCOUNTER
03/25//2020, 1047 - Patient telephoned call transferred to this staff member.  Patient made aware of verbal order received per Dr. Saturnino Chambers M.D./ENT - patient to see Primary Care Physician, then follow up with ENT/Audiology. Patient stated he attended clinical appointment with Primary Care Physician, Dr. Jose, [place], [place] last week due to cold he has experienced X several weeks.  Patent stated he was instructed to continue utilizing allergy medication.  Patient stated he is now experiencing decreased hearing in right ear due to clogged sensation.  Patient stated he previously had Bilateral Ear Tubes, however, right ear tube no longer in place.  Patient made aware this staff member will speak with M.D./TARYN Chambers.    03/25/2020, 1054 - Patient telephoned per this staff member [phone].  Zero answer.  Voice message submitted with date, time, office contact information, notification patient may be seen today, Wednesday, March 25, 2020 at 3:00 P.M. in [place], [place], and request to contact office if patient can not attend clinical appointment.

## 2020-03-25 NOTE — PROGRESS NOTES
Subjective   Joseph Heard is a 64 y.o. male.     Facial pressure and ear problem  History of Present Illness   Patient comes in primarily for right ear problem this ear is blocked up some and having difficulty hearing see his primary physician treated with some allergy medications he is not have any ear drainage or pain just cannot hear of his right ear is left is is better hearing ear is also had facial pressure congestion no fever chills he has a known history of sinus and polypoid disease      The following portions of the patient's history were reviewed and updated as appropriate: allergies, current medications, past family history, past medical history, past social history, past surgical history and problem list.      Current Outpatient Medications:   •  cetirizine (zyrTEC) 10 MG tablet, Take 10 mg by mouth Every Night., Disp: , Rfl:   •  fluticasone (FLONASE) 50 MCG/ACT nasal spray, SPRAY TWO SPRAYS IN THE  NOSTRIL(S) DAILY AS DIRECTED BY PROVIDER, Disp: 1 bottle, Rfl: 3  •  Multiple Vitamins-Minerals (COMPLETE MULTIVITAMIN/MINERAL PO), Take 1 tablet by mouth Daily., Disp: , Rfl:   •  Omega-3 Fatty Acids (FISH OIL) 1000 MG capsule capsule, Take 1,200 mg by mouth Daily With Breakfast. 1200 mg 3 days per week, Disp: , Rfl:   •  vitamin C (ASCORBIC ACID) 500 MG tablet, Take 500 mg by mouth Every Night., Disp: , Rfl:   •  vitamin E 400 UNIT capsule, Take 400 Units by mouth Daily. 400 units 3 times per week, Disp: , Rfl:   •  ASMANEX  MCG/ACT aerosol, 200 mcg into the nostril(s) as directed by provider 2 (Two) Times a Day., Disp: 13 g, Rfl: 3  •  atorvastatin (LIPITOR) 10 MG tablet, Take 5 mg by mouth Every Night., Disp: , Rfl:   •  azelastine (ASTELIN) 0.1 % nasal spray, 2 sprays into the nostril(s) as directed by provider 2 (Two) Times a Day. Use in each nostril as directed, Disp: 30 mL, Rfl: 12  •  ipratropium (ATROVENT) 0.06 % nasal spray, 2 sprays into the nostril(s) as directed by provider 3  (Three) Times a Day., Disp: 15 mL, Rfl: 5  •  naproxen sodium (ALEVE) 220 MG tablet, Take 1 tablet by mouth Every Night., Disp: 60 tablet, Rfl: 6  •  predniSONE (DELTASONE) 10 MG tablet, Daily dose: 5 tabs for 2 days, then 4 tabs for 2 days, then 3 tabs for 2 days, then 2 tabs for 2 days, then 1 tab for 2 days, Disp: 30 tablet, Rfl: 0  •  predniSONE (DELTASONE) 10 MG tablet, Daily dose: 5 tabs for 2 days, then 4 tabs for 2 days, then 3 tabs for 2 days, then 2 tabs for 2 days, then 1 tab for 2 days, Disp: 30 tablet, Rfl: 0    No Known Allergies          Review of Systems   Constitutional: Negative for fever.   HENT: Positive for hearing loss and sinus pressure. Negative for ear pain, rhinorrhea and sinus pain.    Neurological: Negative for dizziness.   Hematological: Negative for adenopathy.           Objective   Physical Exam   Constitutional: He appears well-developed.   HENT:   Head: Normocephalic.   Right Ear: External ear normal.   Left Ear: External ear normal.   Ears:    Nose: Nose normal.       Mouth/Throat: Oropharynx is clear and moist. No oropharyngeal exudate.   Eyes: Conjunctivae and EOM are normal.   Neck: Neck supple.   Pulmonary/Chest: Effort normal.   Skin: Skin is warm.   Psychiatric: He has a normal mood and affect.   Nursing note and vitals reviewed.          Assessment/Plan   Joseph was seen today for ear problem, hearing loss and sinus problem.    Diagnoses and all orders for this visit:    Eustachian tube dysfunction, bilateral    Sensorineural hearing loss (SNHL) of both ears    Seasonal allergic rhinitis due to pollen    Other orders  -     predniSONE (DELTASONE) 10 MG tablet; Daily dose: 5 tabs for 2 days, then 4 tabs for 2 days, then 3 tabs for 2 days, then 2 tabs for 2 days, then 1 tab for 2 days    Is not any pain or signs of infection his eustachian tube is blocked he is taken prednisone before and did not have any side effects explained its use we can try and treat him conservatively if  this not resolving may need to have his tubes replaced we discussed what was involved the risk benefits he prefer this approach and all questions were answered he will call if any worsening in the meantime

## 2020-03-25 NOTE — PATIENT INSTRUCTIONS

## 2020-04-08 ENCOUNTER — OFFICE VISIT (OUTPATIENT)
Dept: OTOLARYNGOLOGY | Facility: CLINIC | Age: 65
End: 2020-04-08

## 2020-04-08 ENCOUNTER — CLINICAL SUPPORT (OUTPATIENT)
Dept: AUDIOLOGY | Facility: CLINIC | Age: 65
End: 2020-04-08

## 2020-04-08 VITALS — WEIGHT: 166 LBS | HEIGHT: 68 IN | BODY MASS INDEX: 25.16 KG/M2 | TEMPERATURE: 98.1 F

## 2020-04-08 DIAGNOSIS — H90.3 SENSORINEURAL HEARING LOSS, BILATERAL: Primary | ICD-10-CM

## 2020-04-08 DIAGNOSIS — H90.6 MIXED HEARING LOSS, BILATERAL: ICD-10-CM

## 2020-04-08 DIAGNOSIS — H69.83 EUSTACHIAN TUBE DYSFUNCTION, BILATERAL: ICD-10-CM

## 2020-04-08 DIAGNOSIS — H66.91 CHRONIC OTITIS MEDIA OF RIGHT EAR: ICD-10-CM

## 2020-04-08 DIAGNOSIS — H90.3 SENSORINEURAL HEARING LOSS (SNHL) OF BOTH EARS: Primary | ICD-10-CM

## 2020-04-08 PROCEDURE — 99214 OFFICE O/P EST MOD 30 MIN: CPT | Performed by: OTOLARYNGOLOGY

## 2020-04-08 PROCEDURE — 92557 COMPREHENSIVE HEARING TEST: CPT | Performed by: AUDIOLOGIST

## 2020-04-08 PROCEDURE — 92567 TYMPANOMETRY: CPT | Performed by: AUDIOLOGIST

## 2020-04-08 RX ORDER — IPRATROPIUM BROMIDE 42 UG/1
2 SPRAY, METERED NASAL 3 TIMES DAILY
Qty: 15 ML | Refills: 5 | Status: SHIPPED | OUTPATIENT
Start: 2020-04-08 | End: 2020-06-24 | Stop reason: SDUPTHER

## 2020-04-08 RX ORDER — AZELASTINE 1 MG/ML
2 SPRAY, METERED NASAL 2 TIMES DAILY
Qty: 30 ML | Refills: 12 | Status: SHIPPED | OUTPATIENT
Start: 2020-04-08 | End: 2020-06-24 | Stop reason: SDUPTHER

## 2020-04-08 NOTE — PROGRESS NOTES
STANDARD AUDIOMETRIC EVALUATION      Name:  Joseph Heard  :  1955  Age:  64 y.o.  Date of Evaluation:  2020      HISTORY    Reason for visit:  Joseph Heard was seen today for a hearing evaluation at the request of Dr. Saturnino Chambers.   Mr. Heard presented with complaints of decreased hearing sensitivity in the right ear. He reportedly had a head and chest cold that lasted for approximately five weeks. Mr. Heard expressed that he lost all of his hearing in his right ear following the cold. He was seen by Dr. Chambers approximately two weeks ago and was prescribed a steroid to help with the hearing loss and aural pressure. Mr. Heard reported that the aural pressure was treated by the steroid and some of his hearing in the right ear returned, but it has not come back to his baseline. He denied drainage. Mr. Heard currently has a PE tube in place in the left ear, but his right one had previously extruded. No other significant audiologic information was reported.      EVALUATION    See Audiogram    RESULTS        Otoscopy and Tympanometry 226 Hz :  Right Ear:  Otoscopy:  Visible ear drum          Tympanometry:  Negative middle ear pressure    Left Ear:   Otoscopy:  Visible PE tube        Tympanometry:  Unable to test due to no seal    Test technique:  Standard Audiometry     Pure Tone Audiometry:   Patient responded to pure tones at 40-85 dB for 250-8000 Hz in right ear, and at 30-65 dB for 250-8000 Hz in left ear.       Speech Audiometry:        Right Ear:  Speech Reception Threshold (SRT) was obtained at 50 dBHL                 Speech Discrimination scores were 100% in masking noise when words were presented at  90 dBHL       Left Ear:  Speech Reception Threshold (SRT) was obtained at 30 dBHL                 Speech Discrimination scores were 76% in quiet when words were presented at 70 dBHL    Reliability:   good    IMPRESSIONS:  1.  Tympanometry results are consistent with Negative middle ear  pressure in the right ear. Results could not be recorded in the left ear due to the inability to obtain a seal.  2.  Pure tone results are consistent with a mild sloping to severe primarily sensorineural hearing loss in the right ear and a mild sloping to moderately-severe primarily sensorineural hearing loss in the left ear. An air-bone gap was noted at 1000 Hz bilaterally, but could not be masked due to a masking dilemma. An asymmetry was noted at 8000 Hz with the right ear being the poorer ear.      RECOMMENDATIONS:  Patient is seeing the Ear Nose and Throat physician immediately following this examination.  It was a pleasure seeing Joseph Heard in Audiology today.  We would be happy to do further testing or discuss these test as necessary.              This document has been electronically signed by LENNIE Mcconnell on April 8, 2020 08:38

## 2020-04-08 NOTE — PATIENT INSTRUCTIONS

## 2020-04-08 NOTE — PROGRESS NOTES
Subjective   Joseph Heard is a 64 y.o. male.   Problems with his ears  History of Present Illness   Patient comes in saying he has a slight improvement in his hearing from his perspective comes for audiologic allergy testing is not have any ear drainage pain discomfort he has had tubes in the past and they come out he took prednisone recently but he had some side effects so he did not take it as recommended he is getting over those    No fever chills allergy related problem but otherwise doing well not using his nasal sprays regularly    The following portions of the patient's history were reviewed and updated as appropriate: allergies, current medications, past family history, past medical history, past social history, past surgical history and problem list.      Joseph Heard reports that he has never smoked. He has never used smokeless tobacco. He reports that he drinks alcohol. He reports that he does not use drugs.  Patient is not a tobacco user and has not been counseled for use of tobacco products    Family History   Problem Relation Age of Onset   • Heart disease Mother    • Thyroid disease Mother    • Heart disease Father          Current Outpatient Medications:   •  atorvastatin (LIPITOR) 10 MG tablet, Take 5 mg by mouth Every Night., Disp: , Rfl:   •  azelastine (ASTELIN) 0.1 % nasal spray, 2 sprays into the nostril(s) as directed by provider 2 (Two) Times a Day. Use in each nostril as directed, Disp: 30 mL, Rfl: 12  •  cetirizine (zyrTEC) 10 MG tablet, Take 10 mg by mouth Every Night., Disp: , Rfl:   •  fluticasone (FLONASE) 50 MCG/ACT nasal spray, SPRAY TWO SPRAYS IN THE  NOSTRIL(S) DAILY AS DIRECTED BY PROVIDER, Disp: 1 bottle, Rfl: 3  •  Multiple Vitamins-Minerals (COMPLETE MULTIVITAMIN/MINERAL PO), Take 1 tablet by mouth Daily., Disp: , Rfl:   •  naproxen sodium (ALEVE) 220 MG tablet, Take 1 tablet by mouth Every Night., Disp: 60 tablet, Rfl: 6  •  Omega-3 Fatty Acids (FISH OIL) 1000 MG  capsule capsule, Take 1,200 mg by mouth Daily With Breakfast. 1200 mg 3 days per week, Disp: , Rfl:   •  vitamin C (ASCORBIC ACID) 500 MG tablet, Take 500 mg by mouth Every Night., Disp: , Rfl:   •  vitamin E 400 UNIT capsule, Take 400 Units by mouth Daily. 400 units 3 times per week, Disp: , Rfl:   •  ASMANEX  MCG/ACT aerosol, 200 mcg into the nostril(s) as directed by provider 2 (Two) Times a Day., Disp: 13 g, Rfl: 3  •  ipratropium (ATROVENT) 0.06 % nasal spray, 2 sprays into the nostril(s) as directed by provider 3 (Three) Times a Day., Disp: 15 mL, Rfl: 5  •  predniSONE (DELTASONE) 10 MG tablet, Daily dose: 5 tabs for 2 days, then 4 tabs for 2 days, then 3 tabs for 2 days, then 2 tabs for 2 days, then 1 tab for 2 days, Disp: 30 tablet, Rfl: 0  •  predniSONE (DELTASONE) 10 MG tablet, Daily dose: 5 tabs for 2 days, then 4 tabs for 2 days, then 3 tabs for 2 days, then 2 tabs for 2 days, then 1 tab for 2 days, Disp: 30 tablet, Rfl: 0    No Known Allergies    Past Medical History:   Diagnosis Date   • Head ache    • Hyperlipemia        Past Surgical History:   Procedure Laterality Date   • BACK SURGERY     • ENDOSCOPIC FUNCTIONAL SINUS SURGERY (FESS) N/A 4/6/2018    Procedure: BILATERAL ENDOSCOPIC SINUS SURGERY OF MAXILLARY ETHMOID;  Surgeon: Saturnino Chambers MD;  Location: [facility];  Service: ENT   • FINGER SURGERY      right index   • LEG SURGERY Left    • NASAL FRACTURE SURGERY     • NASAL SEPTOPLASTY W/ TURBINOPLASTY Left 4/6/2018    Procedure: AND POSSIBLE FRONTAL, NASAL SEPTAL AND NASAL VALVE REPAIR AND BILATERAL TURBINATE SURGERY;  Surgeon: Saturnino Chambers MD;  Location: [facility];  Service: ENT   • NASAL SEPTUM SURGERY     • SHOULDER SURGERY Right    • TONSILLECTOMY         Review of Systems   Constitutional: Negative for fever.   HENT: Positive for congestion and tinnitus. Negative for ear discharge, ear pain and facial swelling.    Musculoskeletal: Positive for myalgias.   Neurological: Negative  for dizziness.   Hematological: Negative for adenopathy.           Objective   Physical Exam   Constitutional: He appears well-developed.   HENT:   Head: Normocephalic.   Right Ear: External ear and ear canal normal. No drainage. No mastoid tenderness. Tympanic membrane is scarred. Tympanic membrane is not erythematous. A middle ear effusion is present. Decreased hearing is noted.   Left Ear: External ear and ear canal normal. No drainage. No mastoid tenderness. Tympanic membrane is scarred. Tympanic membrane is not erythematous. Decreased hearing is noted.   Nose: Nose normal.   Mouth/Throat: Oropharynx is clear and moist.   Eyes: Conjunctivae are normal.   Neck: Normal range of motion.   Cardiovascular: Normal rate and regular rhythm.   Pulmonary/Chest: Effort normal and breath sounds normal.   Lymphadenopathy:     He has no cervical adenopathy.   Neurological: He is alert.   Skin: Skin is warm.   Psychiatric: He has a normal mood and affect.   Nursing note and vitals reviewed.    The audiogram was reviewed the patient showing bilateral mixed hearing loss right worse than left with flat tympanogram on right large volume on the left better hearing ear        Assessment/Plan   Joseph was seen today for follow-up.    Diagnoses and all orders for this visit:    Sensorineural hearing loss (SNHL) of both ears  -     Case Request; Standing  -     Case Request    Eustachian tube dysfunction, bilateral  -     Case Request; Standing  -     Case Request    Mixed hearing loss, bilateral  -     Case Request; Standing  -     Case Request    Chronic otitis media of right ear    Other orders  -     azelastine (ASTELIN) 0.1 % nasal spray; 2 sprays into the nostril(s) as directed by provider 2 (Two) Times a Day. Use in each nostril as directed  -     ipratropium (ATROVENT) 0.06 % nasal spray; 2 sprays into the nostril(s) as directed by provider 3 (Three) Times a Day.      She wants to consider having tubes replaced and go with  long-term tubes versus short-term tubes he wants to consider T tubes.  Discussed risk benefits including risk of bleeding infection scarring perforation need for further surgery tinnitus vertigo need for other surgeries repair the eardrum remove the tubes placed the tubes and failure to help with symptoms he is also hearing a candidate he is to avoid loud noise because he has nerve hearing loss.  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  He said he had some cramps in his legs recently I suggest he talk to his primary physician he started taking some treatment with thanks to help increase his magnesium and potassium  He denies any other new major chronic medical medical problems done concern regarding surgery wants to have general anesthesia with T tubes when available after the epidemic sober

## 2020-04-27 ENCOUNTER — TELEPHONE (OUTPATIENT)
Dept: OTOLARYNGOLOGY | Facility: CLINIC | Age: 65
End: 2020-04-27

## 2020-04-27 NOTE — TELEPHONE ENCOUNTER
04/27/2020, 1506 - Patient telephoned per this staff member [phone].  Zero answer.  Voice message submitted with date, time, office contact information, and request to contact office of Dr. Saturnino Chambers M.D./ENT at earliest convenience regarding surgery schedule date.    Note - Bilateral Tube Insertion scheduled for Tuesday, May 5, 2020 cancelled.  Unable to complete procedure due to surgical restrictions per Governor, Mati Subramanian.  Patient will be contacted to reschedule procedure once surgical restrictions have been lifted.

## 2020-05-04 ENCOUNTER — TELEPHONE (OUTPATIENT)
Dept: OTOLARYNGOLOGY | Facility: CLINIC | Age: 65
End: 2020-05-04

## 2020-05-04 NOTE — TELEPHONE ENCOUNTER
----- Message from Bria Mathews sent at 5/4/2020  9:36 AM CDT -----  Contact: [phone]  Returned your call.

## 2020-05-04 NOTE — TELEPHONE ENCOUNTER
05/04/2020, 2314 - Patient telephoned per this staff member [phone] with notification Bilateral Tube Insertion scheduled for Tuesday, May 5, 2020 cancelled due to continued COVID 19 surgical restrictions per Governor, Mati Sburamanian. Patient made aware he will be contacted to reschedule procedure end of May or early June.  Patient verbalized understanding and stated he had been notified of cancellation previously.    Note - Audiology appointment scheduled Wednesday, May 27, 2020 at 9:00 A.M. and post operative clinical appointment scheduled Wednesday, May 27, 2020 at 9:30 A.M. canceled.

## 2020-05-04 NOTE — TELEPHONE ENCOUNTER
05/004/2020, 0847 - Patient telephoned per this staff member [phone].  Zero answer.  Voice message submitted with date, time, office contact information, and request to contact office of Dr. Saturnino Chambers M.D./ENT at earliest convenience regarding scheduled date of surgical procedure.    Note - Bilateral Tube Insertion scheduled for Tuesday, May 5, 2020 cancelled. Unable to complete procedure due to surgical restrictions per Governor, Mati Subramanian.  Patient will be contacted to reschedule procedure once surgical restrictions have been lifted.

## 2020-05-26 PROCEDURE — U0003 INFECTIOUS AGENT DETECTION BY NUCLEIC ACID (DNA OR RNA); SEVERE ACUTE RESPIRATORY SYNDROME CORONAVIRUS 2 (SARS-COV-2) (CORONAVIRUS DISEASE [COVID-19]), AMPLIFIED PROBE TECHNIQUE, MAKING USE OF HIGH THROUGHPUT TECHNOLOGIES AS DESCRIBED BY CMS-2020-01-R: HCPCS | Performed by: OTOLARYNGOLOGY

## 2020-05-27 LAB
COVID LABCORP PRIORITY: NORMAL
SARS-COV-2 RNA RESP QL NAA+PROBE: NOT DETECTED

## 2020-05-29 ENCOUNTER — ANESTHESIA (OUTPATIENT)
Dept: PERIOP | Facility: HOSPITAL | Age: 65
End: 2020-05-29

## 2020-05-29 ENCOUNTER — ANESTHESIA EVENT (OUTPATIENT)
Dept: PERIOP | Facility: HOSPITAL | Age: 65
End: 2020-05-29

## 2020-05-29 ENCOUNTER — HOSPITAL ENCOUNTER (OUTPATIENT)
Facility: HOSPITAL | Age: 65
Setting detail: HOSPITAL OUTPATIENT SURGERY
Discharge: HOME | End: 2020-05-29
Attending: OTOLARYNGOLOGY | Admitting: OTOLARYNGOLOGY

## 2020-05-29 VITALS
OXYGEN SATURATION: 97 % | WEIGHT: 162.92 LBS | SYSTOLIC BLOOD PRESSURE: 148 MMHG | HEIGHT: 68 IN | TEMPERATURE: 97.5 F | HEART RATE: 52 BPM | BODY MASS INDEX: 24.69 KG/M2 | RESPIRATION RATE: 18 BRPM | DIASTOLIC BLOOD PRESSURE: 74 MMHG

## 2020-05-29 DIAGNOSIS — H69.83 EUSTACHIAN TUBE DYSFUNCTION, BILATERAL: ICD-10-CM

## 2020-05-29 DIAGNOSIS — H90.3 SENSORINEURAL HEARING LOSS (SNHL) OF BOTH EARS: ICD-10-CM

## 2020-05-29 DIAGNOSIS — H90.6 MIXED HEARING LOSS, BILATERAL: ICD-10-CM

## 2020-05-29 PROCEDURE — 25010000002 ONDANSETRON PER 1 MG: Performed by: NURSE ANESTHETIST, CERTIFIED REGISTERED

## 2020-05-29 PROCEDURE — 25010000002 DEXAMETHASONE PER 1 MG: Performed by: NURSE ANESTHETIST, CERTIFIED REGISTERED

## 2020-05-29 PROCEDURE — 25010000002 MIDAZOLAM PER 1 MG: Performed by: NURSE ANESTHETIST, CERTIFIED REGISTERED

## 2020-05-29 PROCEDURE — 25010000002 PROPOFOL 10 MG/ML EMULSION: Performed by: NURSE ANESTHETIST, CERTIFIED REGISTERED

## 2020-05-29 PROCEDURE — 69436 CREATE EARDRUM OPENING: CPT | Performed by: OTOLARYNGOLOGY

## 2020-05-29 PROCEDURE — 25010000002 FENTANYL CITRATE (PF) 100 MCG/2ML SOLUTION: Performed by: NURSE ANESTHETIST, CERTIFIED REGISTERED

## 2020-05-29 DEVICE — VENT TUBE 1016010 5PK GOODE T 12MM SILIC
Type: IMPLANTABLE DEVICE | Site: EAR | Status: FUNCTIONAL
Brand: GOODE T-TUBE®

## 2020-05-29 RX ORDER — LIDOCAINE HYDROCHLORIDE 20 MG/ML
INJECTION, SOLUTION INFILTRATION; PERINEURAL AS NEEDED
Status: DISCONTINUED | OUTPATIENT
Start: 2020-05-29 | End: 2020-05-29 | Stop reason: SURG

## 2020-05-29 RX ORDER — ONDANSETRON 2 MG/ML
INJECTION INTRAMUSCULAR; INTRAVENOUS AS NEEDED
Status: DISCONTINUED | OUTPATIENT
Start: 2020-05-29 | End: 2020-05-29 | Stop reason: SURG

## 2020-05-29 RX ORDER — PROMETHAZINE HYDROCHLORIDE 25 MG/1
25 SUPPOSITORY RECTAL ONCE AS NEEDED
Status: DISCONTINUED | OUTPATIENT
Start: 2020-05-29 | End: 2020-05-29 | Stop reason: HOSPADM

## 2020-05-29 RX ORDER — PROPOFOL 10 MG/ML
VIAL (ML) INTRAVENOUS AS NEEDED
Status: DISCONTINUED | OUTPATIENT
Start: 2020-05-29 | End: 2020-05-29 | Stop reason: SURG

## 2020-05-29 RX ORDER — PROMETHAZINE HYDROCHLORIDE 25 MG/ML
12.5 INJECTION, SOLUTION INTRAMUSCULAR; INTRAVENOUS ONCE AS NEEDED
Status: DISCONTINUED | OUTPATIENT
Start: 2020-05-29 | End: 2020-05-29 | Stop reason: HOSPADM

## 2020-05-29 RX ORDER — ONDANSETRON 2 MG/ML
4 INJECTION INTRAMUSCULAR; INTRAVENOUS ONCE AS NEEDED
Status: DISCONTINUED | OUTPATIENT
Start: 2020-05-29 | End: 2020-05-29 | Stop reason: HOSPADM

## 2020-05-29 RX ORDER — EPHEDRINE SULFATE 50 MG/ML
5 INJECTION, SOLUTION INTRAVENOUS ONCE AS NEEDED
Status: DISCONTINUED | OUTPATIENT
Start: 2020-05-29 | End: 2020-05-29 | Stop reason: HOSPADM

## 2020-05-29 RX ORDER — DEXAMETHASONE SODIUM PHOSPHATE 4 MG/ML
INJECTION, SOLUTION INTRA-ARTICULAR; INTRALESIONAL; INTRAMUSCULAR; INTRAVENOUS; SOFT TISSUE AS NEEDED
Status: DISCONTINUED | OUTPATIENT
Start: 2020-05-29 | End: 2020-05-29 | Stop reason: SURG

## 2020-05-29 RX ORDER — ACETAMINOPHEN 325 MG/1
650 TABLET ORAL ONCE AS NEEDED
Status: DISCONTINUED | OUTPATIENT
Start: 2020-05-29 | End: 2020-05-29 | Stop reason: HOSPADM

## 2020-05-29 RX ORDER — DIPHENHYDRAMINE HYDROCHLORIDE 50 MG/ML
12.5 INJECTION INTRAMUSCULAR; INTRAVENOUS
Status: DISCONTINUED | OUTPATIENT
Start: 2020-05-29 | End: 2020-05-29 | Stop reason: HOSPADM

## 2020-05-29 RX ORDER — ACETAMINOPHEN 650 MG/1
650 SUPPOSITORY RECTAL ONCE AS NEEDED
Status: DISCONTINUED | OUTPATIENT
Start: 2020-05-29 | End: 2020-05-29 | Stop reason: HOSPADM

## 2020-05-29 RX ORDER — MIDAZOLAM HYDROCHLORIDE 1 MG/ML
INJECTION INTRAMUSCULAR; INTRAVENOUS AS NEEDED
Status: DISCONTINUED | OUTPATIENT
Start: 2020-05-29 | End: 2020-05-29 | Stop reason: SURG

## 2020-05-29 RX ORDER — PROMETHAZINE HYDROCHLORIDE 25 MG/1
25 TABLET ORAL ONCE AS NEEDED
Status: DISCONTINUED | OUTPATIENT
Start: 2020-05-29 | End: 2020-05-29 | Stop reason: HOSPADM

## 2020-05-29 RX ORDER — SODIUM CHLORIDE, SODIUM GLUCONATE, SODIUM ACETATE, POTASSIUM CHLORIDE, AND MAGNESIUM CHLORIDE 526; 502; 368; 37; 30 MG/100ML; MG/100ML; MG/100ML; MG/100ML; MG/100ML
1000 INJECTION, SOLUTION INTRAVENOUS CONTINUOUS
Status: DISCONTINUED | OUTPATIENT
Start: 2020-05-29 | End: 2020-05-29 | Stop reason: HOSPADM

## 2020-05-29 RX ORDER — FLUMAZENIL 0.1 MG/ML
0.2 INJECTION INTRAVENOUS AS NEEDED
Status: DISCONTINUED | OUTPATIENT
Start: 2020-05-29 | End: 2020-05-29 | Stop reason: HOSPADM

## 2020-05-29 RX ORDER — NALOXONE HCL 0.4 MG/ML
0.4 VIAL (ML) INJECTION AS NEEDED
Status: DISCONTINUED | OUTPATIENT
Start: 2020-05-29 | End: 2020-05-29 | Stop reason: HOSPADM

## 2020-05-29 RX ORDER — LABETALOL HYDROCHLORIDE 5 MG/ML
5 INJECTION, SOLUTION INTRAVENOUS
Status: DISCONTINUED | OUTPATIENT
Start: 2020-05-29 | End: 2020-05-29 | Stop reason: HOSPADM

## 2020-05-29 RX ORDER — OFLOXACIN 3 MG/ML
SOLUTION AURICULAR (OTIC) AS NEEDED
Status: DISCONTINUED | OUTPATIENT
Start: 2020-05-29 | End: 2020-05-29 | Stop reason: HOSPADM

## 2020-05-29 RX ORDER — FENTANYL CITRATE 50 UG/ML
INJECTION, SOLUTION INTRAMUSCULAR; INTRAVENOUS AS NEEDED
Status: DISCONTINUED | OUTPATIENT
Start: 2020-05-29 | End: 2020-05-29 | Stop reason: SURG

## 2020-05-29 RX ADMIN — LIDOCAINE HYDROCHLORIDE 60 MG: 20 INJECTION, SOLUTION INFILTRATION; PERINEURAL at 08:47

## 2020-05-29 RX ADMIN — PROPOFOL 100 MG: 10 INJECTION, EMULSION INTRAVENOUS at 08:47

## 2020-05-29 RX ADMIN — MIDAZOLAM HYDROCHLORIDE 1 MG: 2 INJECTION, SOLUTION INTRAMUSCULAR; INTRAVENOUS at 08:42

## 2020-05-29 RX ADMIN — FENTANYL CITRATE 50 MCG: 50 INJECTION, SOLUTION INTRAMUSCULAR; INTRAVENOUS at 08:42

## 2020-05-29 RX ADMIN — DEXAMETHASONE SODIUM PHOSPHATE 4 MG: 4 INJECTION, SOLUTION INTRAMUSCULAR; INTRAVENOUS at 08:55

## 2020-05-29 RX ADMIN — SODIUM CHLORIDE, SODIUM GLUCONATE, SODIUM ACETATE, POTASSIUM CHLORIDE, AND MAGNESIUM CHLORIDE 1000 ML: 526; 502; 368; 37; 30 INJECTION, SOLUTION INTRAVENOUS at 07:30

## 2020-05-29 RX ADMIN — ONDANSETRON 4 MG: 2 INJECTION INTRAMUSCULAR; INTRAVENOUS at 08:55

## 2020-05-29 NOTE — ANESTHESIA PREPROCEDURE EVALUATION
Anesthesia Evaluation     Patient summary reviewed and Nursing notes reviewed   NPO Solid Status: > 8 hours  NPO Liquid Status: > 8 hours           Airway   Mallampati: II  TM distance: >3 FB  Neck ROM: full  possible difficult intubation  Dental    (+) poor dentation    Pulmonary - negative pulmonary ROS and normal exam    breath sounds clear to auscultation  (-) not a smoker  Cardiovascular - normal exam    Rhythm: regular  Rate: normal    (+) hyperlipidemia,       Neuro/Psych  (+) headaches,     GI/Hepatic/Renal/Endo - negative ROS     Musculoskeletal (-) negative ROS    Abdominal    Substance History - negative use     OB/GYN negative ob/gyn ROS         Other - negative ROS                         Anesthesia Plan    ASA 3     general     intravenous induction     Anesthetic plan, all risks, benefits, and alternatives have been provided, discussed and informed consent has been obtained with: patient and spouse/significant other.

## 2020-05-29 NOTE — ANESTHESIA POSTPROCEDURE EVALUATION
Patient: Joseph Heard    Procedure Summary     Date:  05/29/20 Room / Location:  [facility] OR  / [facility] OR    Anesthesia Start:  0842 Anesthesia Stop:  0910    Procedure:  INSERTION OF EAR TUBES (Bilateral Ear) Diagnosis:       Sensorineural hearing loss (SNHL) of both ears      Eustachian tube dysfunction, bilateral      Mixed hearing loss, bilateral      (Sensorineural hearing loss (SNHL) of both ears [H90.3])      (Eustachian tube dysfunction, bilateral [H69.83])      (Mixed hearing loss, bilateral [H90.6])    Surgeon:  Saturnino Chambers MD Provider:  Nishant Stoddard MD    Anesthesia Type:  general ASA Status:  3          Anesthesia Type: general    Vitals  No vitals data found for the desired time range.          Post Anesthesia Care and Evaluation    Patient location during evaluation: PACU  Patient participation: waiting for patient participation  Level of consciousness: obtunded/minimal responses  Pain score: 0  Pain management: adequate  Airway patency: patent  Anesthetic complications: No anesthetic complications  PONV Status: none  Cardiovascular status: acceptable  Respiratory status: acceptable, face mask and unassisted  Hydration status: acceptable

## 2020-05-29 NOTE — ANESTHESIA PROCEDURE NOTES
Airway  Urgency: elective    Date/Time: 5/29/2020 8:49 AM  Airway not difficult    General Information and Staff    Patient location during procedure: OR  CRNA: Lei Bolden CRNA    Indications and Patient Condition  Indications for airway management: airway protection    Preoxygenated: yes  MILS maintained throughout      Final Airway Details  Final airway type: supraglottic airway      Successful airway: I-gel  Size 4    Number of attempts at approach: 1  Assessment: lips, teeth, and gum same as pre-op

## 2020-06-02 LAB
LAB AP CASE REPORT: NORMAL
PATH REPORT.FINAL DX SPEC: NORMAL

## 2020-06-24 ENCOUNTER — OFFICE VISIT (OUTPATIENT)
Dept: OTOLARYNGOLOGY | Facility: CLINIC | Age: 65
End: 2020-06-24

## 2020-06-24 ENCOUNTER — CLINICAL SUPPORT (OUTPATIENT)
Dept: AUDIOLOGY | Facility: CLINIC | Age: 65
End: 2020-06-24

## 2020-06-24 VITALS — BODY MASS INDEX: 25.01 KG/M2 | WEIGHT: 165 LBS | HEIGHT: 68 IN | TEMPERATURE: 97.8 F

## 2020-06-24 DIAGNOSIS — H69.83 EUSTACHIAN TUBE DYSFUNCTION, BILATERAL: ICD-10-CM

## 2020-06-24 DIAGNOSIS — H90.3 SENSORINEURAL HEARING LOSS (SNHL) OF BOTH EARS: ICD-10-CM

## 2020-06-24 DIAGNOSIS — H90.3 SENSORINEURAL HEARING LOSS, BILATERAL: Primary | ICD-10-CM

## 2020-06-24 DIAGNOSIS — Z09 POSTOP CHECK: Primary | ICD-10-CM

## 2020-06-24 PROCEDURE — 92567 TYMPANOMETRY: CPT | Performed by: AUDIOLOGIST

## 2020-06-24 PROCEDURE — 92557 COMPREHENSIVE HEARING TEST: CPT | Performed by: AUDIOLOGIST

## 2020-06-24 PROCEDURE — 99024 POSTOP FOLLOW-UP VISIT: CPT | Performed by: OTOLARYNGOLOGY

## 2020-06-24 RX ORDER — AZELASTINE 1 MG/ML
2 SPRAY, METERED NASAL 2 TIMES DAILY
Qty: 30 ML | Refills: 12 | Status: SHIPPED | OUTPATIENT
Start: 2020-06-24 | End: 2021-12-20

## 2020-06-24 RX ORDER — IPRATROPIUM BROMIDE 42 UG/1
2 SPRAY, METERED NASAL 3 TIMES DAILY
Qty: 15 ML | Refills: 5 | Status: SHIPPED | OUTPATIENT
Start: 2020-06-24 | End: 2021-12-20

## 2020-06-24 NOTE — PATIENT INSTRUCTIONS
MyPlate from USDA    MyPlate is an outline of a general healthy diet based on the 2010 Dietary Guidelines for Americans, from the U.S. Department of Agriculture (USDA). It sets guidelines for how much food you should eat from each food group based on your age, sex, and level of physical activity.  What are tips for following MyPlate?  To follow MyPlate recommendations:  · Eat a wide variety of fruits and vegetables, grains, and protein foods.  · Serve smaller portions and eat less food throughout the day.  · Limit portion sizes to avoid overeating.  · Enjoy your food.  · Get at least 150 minutes of exercise every week. This is about 30 minutes each day, 5 or more days per week.  It can be difficult to have every meal look like MyPlate. Think about MyPlate as eating guidelines for an entire day, rather than each individual meal.  Fruits and vegetables  · Make half of your plate fruits and vegetables.  · Eat many different colors of fruits and vegetables each day.  · For a 2,000 calorie daily food plan, eat:  ? 2½ cups of vegetables every day.  ? 2 cups of fruit every day.  · 1 cup is equal to:  ? 1 cup raw or cooked vegetables.  ? 1 cup raw fruit.  ? 1 medium-sized orange, apple, or banana.  ? 1 cup 100% fruit or vegetable juice.  ? 2 cups raw leafy greens, such as lettuce, spinach, or kale.  ? ½ cup dried fruit.  Grains  · One fourth of your plate should be grains.  · Make at least half of the grains you eat each day whole grains.  · For a 2,000 calorie daily food plan, eat 6 oz of grains every day.  · 1 oz is equal to:  ? 1 slice bread.  ? 1 cup cereal.  ? ½ cup cooked rice, cereal, or pasta.  Protein  · One fourth of your plate should be protein.  · Eat a wide variety of protein foods, including meat, poultry, fish, eggs, beans, nuts, and tofu.  · For a 2,000 calorie daily food plan, eat 5½ oz of protein every day.  · 1 oz is equal to:  ? 1 oz meat, poultry, or fish.  ? ¼ cup cooked beans.  ? 1 egg.  ? ½ oz nuts  or seeds.  ? 1 Tbsp peanut butter.  Dairy  · Drink fat-free or low-fat (1%) milk.  · Eat or drink dairy as a side to meals.  · For a 2,000 calorie daily food plan, eat or drink 3 cups of dairy every day.  · 1 cup is equal to:  ? 1 cup milk, yogurt, cottage cheese, or soy milk (soy beverage).  ? 2 oz processed cheese.  ? 1½ oz natural cheese.  Fats, oils, salt, and sugars  · Only small amounts of oils are recommended.  · Avoid foods that are high in calories and low in nutritional value (empty calories), like foods high in fat or added sugars.  · Choose foods that are low in salt (sodium). Choose foods that have less than 140 milligrams (mg) of sodium per serving.  · Drink water instead of sugary drinks. Drink enough water each day to keep your urine pale yellow.  Where to find support  · Work with your health care provider or a nutrition specialist (dietitian) to develop a customized eating plan that is right for you.  · Download an zadnra (mobile application) to help you track your daily food intake.  Where to find more information  · Go to ChooseMyPlate.gov for more information.  · Learn more and log your daily food intake according to MyPlate using USDA's SuperTracker: www.KrowdPader.usda.gov  Summary  · MyPlate is a general guideline for healthy eating from the USDA. It is based on the 2010 Dietary Guidelines for Americans.  · In general, fruits and vegetables should take up ½ of your plate, grains should take up ¼ of your plate, and protein should take up ¼ of your plate.  This information is not intended to replace advice given to you by your health care provider. Make sure you discuss any questions you have with your health care provider.  Document Released: 01/06/2009 Document Revised: 01/19/2019 Document Reviewed: 03/19/2018  Elsevier Patient Education © 2020 Elsevier Inc.

## 2020-06-24 NOTE — PROGRESS NOTES
STANDARD AUDIOMETRIC EVALUATION      Name:  Joseph Heard  :  1955  Age:  64 y.o.  Date of Evaluation:  2020      HISTORY    Reason for visit:  Joseph Heard is seen today for a post operative hearing test at the request of Dr. Saturnino Chambers.  Patient reports he just got tubes in his ears, and his ears have been doing better since the tubes. He states his hearing has improved as well.  He states he has had hayfever the last couple days, and his ears have been popping due to congestion.       EVALUATION    See Audiogram    RESULTS        Otoscopy and Tympanometry 226 Hz :  Right Ear:  Otoscopy:  Clear ear canal          Tympanometry:  Large ear canal volume consistent with a patent PE tube    Left Ear:   Otoscopy:  Clear ear canal        Tympanometry:  Large ear canal volume consistent with a patent PE tube    Test technique:  Standard Audiometry     Pure Tone Audiometry:   Patient responded to pure tones at 30-65 dB for 250-8000 Hz in right ear, and at 25-60 dB for 250-8000 Hz in left ear.       Speech Audiometry:        Right Ear:  Speech Reception Threshold (SRT) was obtained at 35 dBHL                 Speech Discrimination scores were 72% in quiet when words were presented at 75 dBHL       Left Ear:  Speech Reception Threshold (SRT) was obtained at 35 dBHL                 Speech Discrimination scores were 80% in quiet when words were presented at 75 dBHL    Reliability:   good    IMPRESSIONS:  1.  Tympanometry results are consistent with Large ear canal volume consistent with a patent PE tube in both ears.  2.  Pure tone results are consistent with mild to moderate sloping sensorineural hearing loss  for both ears.       RECOMMENDATIONS:  Patient is seeing the Ear Nose and Throat physician immediately following this examination.  It was a pleasure seeing Joseph Heard in Audiology today.  We would be happy to do further testing or discuss these test as necessary.          This document  has been electronically signed by Lulu Vincent MS CCC-A on June 24, 2020 14:49       Lulu Vincent MS CCC-A  Licensed Audiologist

## 2020-06-24 NOTE — PROGRESS NOTES
Patient notes improvement in hearing no ear drainage or discomfort has occasional popping allergies are bothersome at times not using a sprays consistently have refilled sprays discussed importance keep the ear dry and will to call us for otherwise recheck in the fall he is to call if any change or problems on please is done well with the PE tubes there is no evidence of complications or problems made a significant difference in his hearing see audiogram which is reviewed in detail with the patient

## 2021-12-20 ENCOUNTER — OFFICE VISIT (OUTPATIENT)
Dept: OTOLARYNGOLOGY | Facility: CLINIC | Age: 66
End: 2021-12-20

## 2021-12-20 ENCOUNTER — CLINICAL SUPPORT (OUTPATIENT)
Dept: AUDIOLOGY | Facility: CLINIC | Age: 66
End: 2021-12-20

## 2021-12-20 VITALS — BODY MASS INDEX: 23.82 KG/M2 | HEIGHT: 68 IN | TEMPERATURE: 97.3 F | WEIGHT: 157.2 LBS

## 2021-12-20 DIAGNOSIS — H69.83 EUSTACHIAN TUBE DYSFUNCTION, BILATERAL: ICD-10-CM

## 2021-12-20 DIAGNOSIS — H92.02 LEFT EAR PAIN: ICD-10-CM

## 2021-12-20 DIAGNOSIS — H90.6 MIXED HEARING LOSS, BILATERAL: Primary | ICD-10-CM

## 2021-12-20 DIAGNOSIS — H61.23 BILATERAL IMPACTED CERUMEN: ICD-10-CM

## 2021-12-20 DIAGNOSIS — J30.1 SEASONAL ALLERGIC RHINITIS DUE TO POLLEN: ICD-10-CM

## 2021-12-20 DIAGNOSIS — Z86.69 HISTORY OF OTITIS MEDIA: ICD-10-CM

## 2021-12-20 DIAGNOSIS — H90.A32 MIXED CONDUCTIVE AND SENSORINEURAL HEARING LOSS OF LEFT EAR WITH RESTRICTED HEARING OF RIGHT EAR: Primary | ICD-10-CM

## 2021-12-20 PROCEDURE — 99213 OFFICE O/P EST LOW 20 MIN: CPT | Performed by: OTOLARYNGOLOGY

## 2021-12-20 PROCEDURE — 92567 TYMPANOMETRY: CPT | Performed by: AUDIOLOGIST

## 2021-12-20 PROCEDURE — 92557 COMPREHENSIVE HEARING TEST: CPT | Performed by: AUDIOLOGIST

## 2021-12-20 PROCEDURE — 69210 REMOVE IMPACTED EAR WAX UNI: CPT | Performed by: OTOLARYNGOLOGY

## 2021-12-20 RX ORDER — AZELASTINE 1 MG/ML
2 SPRAY, METERED NASAL 2 TIMES DAILY
Qty: 30 ML | Refills: 12 | Status: SHIPPED | OUTPATIENT
Start: 2021-12-20

## 2021-12-20 RX ORDER — OFLOXACIN 3 MG/ML
4 SOLUTION AURICULAR (OTIC) 2 TIMES DAILY
Qty: 10 ML | Refills: 0 | Status: SHIPPED | OUTPATIENT
Start: 2021-12-20 | End: 2022-01-18

## 2021-12-20 NOTE — PATIENT INSTRUCTIONS
MyPlate from USDA    MyPlate is an outline of a general healthy diet based on the 2010 Dietary Guidelines for Americans, from the U.S. Department of Agriculture (USDA). It sets guidelines for how much food you should eat from each food group based on your age, sex, and level of physical activity.  What are tips for following MyPlate?  To follow MyPlate recommendations:  · Eat a wide variety of fruits and vegetables, grains, and protein foods.  · Serve smaller portions and eat less food throughout the day.  · Limit portion sizes to avoid overeating.  · Enjoy your food.  · Get at least 150 minutes of exercise every week. This is about 30 minutes each day, 5 or more days per week.  It can be difficult to have every meal look like MyPlate. Think about MyPlate as eating guidelines for an entire day, rather than each individual meal.  Fruits and vegetables  · Make half of your plate fruits and vegetables.  · Eat many different colors of fruits and vegetables each day.  · For a 2,000 calorie daily food plan, eat:  ? 2½ cups of vegetables every day.  ? 2 cups of fruit every day.  · 1 cup is equal to:  ? 1 cup raw or cooked vegetables.  ? 1 cup raw fruit.  ? 1 medium-sized orange, apple, or banana.  ? 1 cup 100% fruit or vegetable juice.  ? 2 cups raw leafy greens, such as lettuce, spinach, or kale.  ? ½ cup dried fruit.  Grains  · One fourth of your plate should be grains.  · Make at least half of the grains you eat each day whole grains.  · For a 2,000 calorie daily food plan, eat 6 oz of grains every day.  · 1 oz is equal to:  ? 1 slice bread.  ? 1 cup cereal.  ? ½ cup cooked rice, cereal, or pasta.  Protein  · One fourth of your plate should be protein.  · Eat a wide variety of protein foods, including meat, poultry, fish, eggs, beans, nuts, and tofu.  · For a 2,000 calorie daily food plan, eat 5½ oz of protein every day.  · 1 oz is equal to:  ? 1 oz meat, poultry, or fish.  ? ¼ cup cooked beans.  ? 1 egg.  ? ½ oz nuts  or seeds.  ? 1 Tbsp peanut butter.  Dairy  · Drink fat-free or low-fat (1%) milk.  · Eat or drink dairy as a side to meals.  · For a 2,000 calorie daily food plan, eat or drink 3 cups of dairy every day.  · 1 cup is equal to:  ? 1 cup milk, yogurt, cottage cheese, or soy milk (soy beverage).  ? 2 oz processed cheese.  ? 1½ oz natural cheese.  Fats, oils, salt, and sugars  · Only small amounts of oils are recommended.  · Avoid foods that are high in calories and low in nutritional value (empty calories), like foods high in fat or added sugars.  · Choose foods that are low in salt (sodium). Choose foods that have less than 140 milligrams (mg) of sodium per serving.  · Drink water instead of sugary drinks. Drink enough water each day to keep your urine pale yellow.  Where to find support  · Work with your health care provider or a nutrition specialist (dietitian) to develop a customized eating plan that is right for you.  · Download an zandra (mobile application) to help you track your daily food intake.  Where to find more information  · Go to ChooseMyPlate.gov for more information.  Summary  · MyPlate is a general guideline for healthy eating from the USDA. It is based on the 2010 Dietary Guidelines for Americans.  · In general, fruits and vegetables should take up ½ of your plate, grains should take up ¼ of your plate, and protein should take up ¼ of your plate.  This information is not intended to replace advice given to you by your health care provider. Make sure you discuss any questions you have with your health care provider.  Document Revised: 05/21/2020 Document Reviewed: 03/19/2018  Elsevier Patient Education © 2021 Elsevier Inc.

## 2021-12-20 NOTE — PROGRESS NOTES
STANDARD AUDIOMETRIC EVALUATION      Name:  Joseph Heard  :  1955  Age:  66 y.o.  Date of Evaluation:  2021      HISTORY    Reason for visit:  Joseph Heard is seen today for a hearing test at the request of Dr. Saturnino Chambers.  Patient reports he has tubes in his ears.  He states recently he has had left ear pain, drainage, and decreased hearing.  He states the drainage has stopped, but he still has the ear ache and decreased hearing for the past 2-3 weeks.      EVALUATION    See Audiogram    RESULTS        Otoscopy and Tympanometry 226 Hz :  Right Ear:  Otoscopy:  Clear ear canal          Tympanometry:  Unable to test due to no seal    Left Ear:   Otoscopy:  Clear ear canal        Tympanometry:  Normal ear canal volume suggests questionable PE tube patency    Test technique:  Standard Audiometry     Pure Tone Audiometry:   Patient responded to pure tones at 25-65 dB for 250-8000 Hz in right ear, and at 50-95 dB for 250-8000 Hz in left ear.       Speech Audiometry:        Right Ear:  Speech Reception Threshold (SRT) was obtained at 30 dBHL                 Speech Discrimination scores were 100% in quiet when words were presented at 70 dBHL       Left Ear:  Speech Reception Threshold (SRT) was obtained at 60 dBHL                 Speech Discrimination scores were 84% in masking noise when words were presented at  90 dBHL    Reliability:   good    IMPRESSIONS:  1.  Tympanometry results are consistent with Unable to test due to no seal in right ear, and Normal ear canal volume suggests questionable PE tube patency in left ear.  2.  Pure tone results are consistent with mild to moderate sloping sensorineural hearing loss  for right ear, and moderate to profound sloping mixed hearing loss  in left ear.       RECOMMENDATIONS:  Patient is seeing the Ear Nose and Throat physician immediately following this examination.  It was a pleasure seeing Joseph Heard in Audiology today.  We would be happy  to do further testing or discuss these test as necessary.          This document has been electronically signed by Lulu Vincent MS CCC-DANA on December 20, 2021 09:15 CST       Lulu Vincent MS CCC-DANA  Licensed Audiologist

## 2021-12-20 NOTE — PROGRESS NOTES
Subjective   Joseph Heard is a 66 y.o. male.       History of Present Illness   Patient noted ear drainage 3 weeks ago after he stopped using his nasal spray says that steroid causes some cloudiness in his right eye so he stopped that and then shortly afterwards had ear drainage thick stopped and then had pressure hearing loss mainly in his left ear.  He has not followed up for a year and a half after his tube placement because of Covid issues so he knew he needed to come in he is not using eardrops not any fever chills he says the pressure better in his left ear but his hearing is still a problem has trouble watching TV is not had any fever chills or dizziness      The following portions of the patient's history were reviewed and updated as appropriate: allergies, current medications, past family history, past medical history, past social history, past surgical history and problem list.      Current Outpatient Medications:   •  cetirizine (zyrTEC) 10 MG tablet, Take 10 mg by mouth Every Night., Disp: , Rfl:   •  azelastine (ASTELIN) 0.1 % nasal spray, 2 sprays into the nostril(s) as directed by provider 2 (Two) Times a Day. Use in each nostril as directed, Disp: 30 mL, Rfl: 12  •  ofloxacin (FLOXIN) 0.3 % otic solution, Administer 4 drops into ear(s) as directed by provider 2 (Two) Times a Day., Disp: 10 mL, Rfl: 0    No Known Allergies          Review of Systems   Constitutional: Negative for fever.   HENT: Positive for ear discharge, ear pain and postnasal drip. Negative for congestion, facial swelling and sore throat.    Eyes: Positive for visual disturbance.   Neurological: Negative for dizziness.   Hematological: Negative for adenopathy.   Psychiatric/Behavioral: Negative.            Objective   Physical Exam  Vitals and nursing note reviewed.   Constitutional:       Appearance: Normal appearance. He is normal weight.   HENT:      Head: Normocephalic.      Nose: Nose normal.      Mouth/Throat:      Mouth:  Mucous membranes are moist.   Eyes:      Conjunctiva/sclera: Conjunctivae normal.   Pulmonary:      Effort: Pulmonary effort is normal.   Musculoskeletal:      Cervical back: No rigidity.   Lymphadenopathy:      Cervical: No cervical adenopathy.   Skin:     General: Skin is warm.   Neurological:      General: No focal deficit present.      Mental Status: He is alert.   Psychiatric:         Mood and Affect: Mood normal.     Ear exam reveals right tube with wax encased along tube in ear canal fair amount was removed and it cannot be complete removed because patient intolerance because the wax with pull on the tube tube appear to be open on the left side there is wax in the end of the tube and dried material which was suctioned off    And other wax removed the ear canal attempts were suction the ear canal multiple size suction toothpicks to clean it out were partially successful patient could not tolerate complete removal      Audiogram was reviewed with the patient showing mixed hearing loss much worse on the left than the right and blocked tube on tympanogram  Actual tracings were shown the patient     Procedure note cerumen was removed much as possible that could be completely removed because of patient intolerance where it was stuck to the tube on the right and some blocking the tube on the left tube is partially suction free of thick mucoid secretions eardrums not erythematous mastoids nontender  Suction forceps and curved pick were used to try and open the tube that was partially opened on the left and wax was removed completely as possible from the ear canal    Assessment/Plan   Diagnoses and all orders for this visit:    1. Mixed hearing loss, bilateral (Primary)    2. Eustachian tube dysfunction, bilateral    3. Seasonal allergic rhinitis due to pollen    4. Bilateral impacted cerumen    Other orders  -     azelastine (ASTELIN) 0.1 % nasal spray; 2 sprays into the nostril(s) as directed by provider 2 (Two)  Times a Day. Use in each nostril as directed  Dispense: 30 mL; Refill: 12  -     ofloxacin (FLOXIN) 0.3 % otic solution; Administer 4 drops into ear(s) as directed by provider 2 (Two) Times a Day.  Dispense: 10 mL; Refill: 0    Is he is having problems with the steroid will switch to an antihistamine as above I explained and that the asthma medicine was taken also as a steroid is not taking it he says the eye problems resolved since he stopped the steroid sprays we will try an antihistamine instead given additional drops to use  To try and get the wax out of his ear and the debris of the tube he will use the eardrops for at least 1 week  He is to follow-up in 3 weeks Dr. Dow to see if the tube can be salvaged since he is not followed up regularly I told him it may be plugged irreversibly and he may have to have the tube replaced

## 2022-01-18 ENCOUNTER — OFFICE VISIT (OUTPATIENT)
Dept: OTOLARYNGOLOGY | Facility: CLINIC | Age: 67
End: 2022-01-18

## 2022-01-18 VITALS — OXYGEN SATURATION: 99 % | WEIGHT: 160 LBS | BODY MASS INDEX: 24.25 KG/M2 | HEIGHT: 68 IN

## 2022-01-18 DIAGNOSIS — J31.0 CHRONIC RHINITIS: ICD-10-CM

## 2022-01-18 DIAGNOSIS — Z48.810 AFTERCARE FOLLOWING SURGERY OF A SENSE ORGAN: Primary | ICD-10-CM

## 2022-01-18 PROCEDURE — 99213 OFFICE O/P EST LOW 20 MIN: CPT | Performed by: OTOLARYNGOLOGY

## 2022-01-18 NOTE — PROGRESS NOTES
Subjective   Joseph Heard is a 66 y.o. male.       History of Present Illness     Former patient of Dr. Chambers with a history of bilateral T-tube insertion.  Saw Dr. Chambers when he was here back in December and his tubes were nonfunctional/partially occluded.  Utilized ofloxacin.  Says his right ear seems to have improved.  Left ear still pops and cracks at times.  Also has chronic rhinitis.  Dr. Chambers prescribed Astelin but patient states that made his nose burn so he went back to using Flonase and he says he is tolerating this in between Flonase, saline, and over-the-counter phenylephrine tablets that keeps his nose reasonably well open.    The following portions of the patient's history were reviewed and updated as appropriate: allergies, current medications, past family history, past medical history, past social history, past surgical history and problem list.     reports that he has never smoked. He has never used smokeless tobacco. He reports current alcohol use. He reports that he does not use drugs.   Patient is not a tobacco user and has not been counseled for use of tobacco products      Review of Systems        Objective   Physical Exam  Ears: External ears no deformity.  Canals no discharge.  Tympanic membrane show T tubes that are both in place and patent at this point.  He does have some crusted material on the surface of his left tympanic membrane and I am able to remove this under the microscope using instrumentation.  Nares: Boggy mucosa.  No pus or polyp noted.  Septum appears to be in the midline.      Assessment/Plan   Diagnoses and all orders for this visit:    1. Aftercare following surgery of a sense organ (Primary)    2. Chronic rhinitis        Plan: Squamous debris removed from left tympanic membrane as described above.  Reassured the patient that otherwise his tubes were in place and patent and functioning properly.  For his nasal symptoms I do not see any anatomic obstruction such as  a polyp and no evidence of ongoing infection so I think he should just continue his medical regimen of Flonase and saline with as needed use of over-the-counter phenylephrine tablets.  Return in 3 months, call sooner for problems.

## 2023-08-03 ENCOUNTER — OFFICE VISIT (OUTPATIENT)
Dept: OTOLARYNGOLOGY | Facility: CLINIC | Age: 68
End: 2023-08-03
Payer: MEDICARE

## 2023-08-03 VITALS — HEART RATE: 79 BPM | OXYGEN SATURATION: 99 % | BODY MASS INDEX: 24.25 KG/M2 | HEIGHT: 68 IN | WEIGHT: 160 LBS

## 2023-08-03 DIAGNOSIS — H61.21 IMPACTED CERUMEN OF RIGHT EAR: ICD-10-CM

## 2023-08-03 DIAGNOSIS — H92.11 PURULENT OTORRHEA OF RIGHT EAR: Primary | ICD-10-CM

## 2023-08-03 DIAGNOSIS — Z45.89 TYMPANOSTOMY TUBE CHECK: ICD-10-CM

## 2023-08-03 DIAGNOSIS — R09.81 NASAL CONGESTION: ICD-10-CM

## 2023-08-03 RX ORDER — CIPROFLOXACIN HYDROCHLORIDE 3.5 MG/ML
SOLUTION/ DROPS TOPICAL
Qty: 10 ML | Refills: 0 | Status: SHIPPED | OUTPATIENT
Start: 2023-08-03

## 2023-08-17 ENCOUNTER — OFFICE VISIT (OUTPATIENT)
Dept: OTOLARYNGOLOGY | Facility: CLINIC | Age: 68
End: 2023-08-17
Payer: MEDICARE

## 2023-08-17 VITALS — WEIGHT: 160 LBS | HEART RATE: 67 BPM | OXYGEN SATURATION: 99 % | BODY MASS INDEX: 24.25 KG/M2 | HEIGHT: 68 IN

## 2023-08-17 DIAGNOSIS — H69.83 EUSTACHIAN TUBE DYSFUNCTION, BILATERAL: Primary | ICD-10-CM

## 2023-08-17 DIAGNOSIS — J31.0 CHRONIC RHINITIS: ICD-10-CM

## 2023-08-17 DIAGNOSIS — H91.93 DECREASED HEARING OF BOTH EARS: ICD-10-CM

## 2023-08-17 DIAGNOSIS — J30.1 SEASONAL ALLERGIC RHINITIS DUE TO POLLEN: ICD-10-CM

## 2023-08-17 RX ORDER — IPRATROPIUM BROMIDE 42 UG/1
2 SPRAY, METERED NASAL 3 TIMES DAILY
Qty: 15 ML | Refills: 11 | Status: SHIPPED | OUTPATIENT
Start: 2023-08-17

## 2023-08-17 NOTE — PROGRESS NOTES
Follow up Regarding: purulent otorrhea    Assessment and Plan:  67-year-old male with eustachian tube dysfunction, bilateral in place and patent T tubes, resolution of previous otitis media, and persistent hearing change as well as allergic rhinitis and chronic rhinitis    -We will add Atrovent to see if this will control some of his nasal drainage  -Continue Flonase and cetirizine  -Follow-up in 3 months to recheck with an audiogram prior to assess the hearing complaints    History of present illness/Interval history:    Mr. Heard is a 67-year-old male presenting today for reevaluation of an acute otitis media of the right ear previously he states he is no longer having drainage from the ear but continues to notice some popping and cracking continues to complain of decreased hearing relative to his previous baseline.  He thinks this is worse on the right than the left.  He states that he continues to experience coughing and throat clearing in the morning which he attributes to significant nasal drainage he also notes that when he goes outside and works outside, which he does frequently, he gets significant clear watery rhinorrhea which is very bothersome.  He is continuing to take Flonase and cetirizine as directed.    Physical Exam:  General: NAD, awake and alert  Head: normocephalic, atraumatic  Eyes: EOMI, sclerae white, conjunctivae pink  Ears: pinnae intact without masses or lesions   Right: TM intact without injection or effusion, T tube IPP   Left: TM intact without injection or effusion, T tube IPP  Nose: external straight without deformity   Mucosa pink, not edematous. No polyps seen. No purulence. Clear rhinorrhea present   Septum: midline   Turbinates: not hypertrophied  OC/OP: mucosa moist and pink  Neuro:  no focal deficits    Vital Signs   Vitals:    08/17/23 1455   Pulse: 67   SpO2: 99%     Results Review:  Reviewed clinic visit from 8/3/2023 reviewed today and demonstrates at that time purulent  otorrhea from the right ear, bilateral T tubes in place recommendations were for Flonase and nasal saline, decreasing decongestant use and to start ciprofloxacin for the purulent otorrhea    Histories:  No Known Allergies    Prior to Admission medications    Medication Sig Start Date End Date Taking? Authorizing Provider   azelastine (ASTELIN) 0.1 % nasal spray 2 sprays into the nostril(s) as directed by provider 2 (Two) Times a Day. Use in each nostril as directed 12/20/21  Yes Saturnino Chambers MD   cetirizine (zyrTEC) 10 MG tablet Take 1 tablet by mouth Every Night.   Yes Provider, MD Fatou   ciprofloxacin (Ciloxan) 0.3 % ophthalmic solution 4 drops BID to right ear for 7 days 8/3/23   Ct Walters MD       Past Medical History:   Diagnosis Date    Head ache     Hyperlipemia     Self-catheterizes urinary bladder        Past Surgical History:   Procedure Laterality Date    BACK SURGERY      EAR TUBES Bilateral 5/29/2020    Procedure: INSERTION OF EAR TUBES;  Surgeon: Saturnino Chambers MD;  Location: [facility];  Service: ENT;  Laterality: Bilateral;    ENDOSCOPIC FUNCTIONAL SINUS SURGERY (FESS) N/A 4/6/2018    Procedure: BILATERAL ENDOSCOPIC SINUS SURGERY OF MAXILLARY ETHMOID;  Surgeon: Saturnino Chambers MD;  Location: [facility];  Service: ENT    FINGER SURGERY      right index    LEG SURGERY Left     NASAL FRACTURE SURGERY      NASAL SEPTOPLASTY W/ TURBINOPLASTY Left 4/6/2018    Procedure: AND POSSIBLE FRONTAL, NASAL SEPTAL AND NASAL VALVE REPAIR AND BILATERAL TURBINATE SURGERY;  Surgeon: Saturnino Chambers MD;  Location: [facility];  Service: ENT    NASAL SEPTUM SURGERY      SHOULDER SURGERY Right     TONSILLECTOMY         Social History     Socioeconomic History    Marital status:    Tobacco Use    Smoking status: Never    Smokeless tobacco: Never   Vaping Use    Vaping Use: Never used   Substance and Sexual Activity    Alcohol use: Yes     Comment: 12/20/2021 - Patient states he consumes alcoholic  beverages occasionally.     Drug use: No    Sexual activity: Defer       Family History   Problem Relation Age of Onset    Heart disease Mother     Thyroid disease Mother     Heart disease Father        10 point ROS asked and negative unless otherwise noted in HPI.    Immunization status not specifically asked and therefore not specifically documented.    Voice dictation disclaimer:  Voice dictation was used in the creation of this note.  As such, there may be typos or inappropriate words throughout the document.  The document is proofread for typos and errors, however some may not be caught.      This document has been electronically signed by Ct Walters MD on August 17, 2023 15:08 CDT

## (undated) DEVICE — GLV SURG SENSICARE GREEN W/ALOE PF LF 6.5 STRL

## (undated) DEVICE — GLV SURG TRIUMPH LT PF LTX 7.5 STRL

## (undated) DEVICE — GLV SURG TRIUMPH LT PF LTX 8 STRL

## (undated) DEVICE — SPLNT NASL AIRWY SIL STRL

## (undated) DEVICE — Device

## (undated) DEVICE — NDL SPINE 25G 4 11/16 BLU

## (undated) DEVICE — DEFOGGER!" ANTI FOG KIT: Brand: DEROYAL

## (undated) DEVICE — COAGULATOR SXN HNDSWITCH 10F16IN

## (undated) DEVICE — GLV SURG SENSICARE PI LF PF 8 GRN STRL

## (undated) DEVICE — SOL IRR NACL 0.9PCT BT 1000ML

## (undated) DEVICE — TOWEL,OR,DSP,ST,BLUE,DLX,4/PK,20PK/CS: Brand: MEDLINE

## (undated) DEVICE — BLD MYRNGTMY JUVENILE SPEAR 3.5IN

## (undated) DEVICE — GLV SURG TRIUMPH LT PF LTX 6.5 STRL

## (undated) DEVICE — SOL IRR H2O BTL 1000ML STRL

## (undated) DEVICE — SOL IRRIG NACL 1000ML

## (undated) DEVICE — STERILE POLYISOPRENE POWDER-FREE SURGICAL GLOVES WITH EMOLLIENT COATING: Brand: PROTEXIS

## (undated) DEVICE — DENTAL NEEDLE,METAL HUB,27 G LONG: Brand: MONOJECT

## (undated) DEVICE — SUT PLAIN 1 4/0 1828H

## (undated) DEVICE — SUT ETHLN 2/0 FS 18IN 664H

## (undated) DEVICE — POSTN HD RING CUSH 9IN LF

## (undated) DEVICE — COVER,MAYO STAND,STERILE: Brand: MEDLINE

## (undated) DEVICE — STERILE COTTON BALLS LARGE 5/P: Brand: MEDLINE

## (undated) DEVICE — CODMAN® SURGICAL PATTIES 1/2" X 3" (1.27CM X 7.62CM): Brand: CODMAN®

## (undated) DEVICE — GLV SURG SENSICARE GREEN W/ALOE PF LF 8 STRL

## (undated) DEVICE — TP SXN YANKR BLB TIP W/TBG 10F LF STRL

## (undated) DEVICE — TRY IRR

## (undated) DEVICE — BLD BIPOL DIEGO SMR STR STD TYPE A 2MM

## (undated) DEVICE — TBG DECLOG DIEGO ELITE MULTIDEBRIDER ST

## (undated) DEVICE — CONTAINER,SPECIMEN,OR STERILE,4OZ: Brand: MEDLINE

## (undated) DEVICE — GOWN,AURORA,NOREINF,RAGLAN,XL,STERILE: Brand: MEDLINE

## (undated) DEVICE — TOWEL,OR,DSP,ST,BLUE,DLX,8/PK,10PK/CS: Brand: MEDLINE

## (undated) DEVICE — PK ENT LF 60

## (undated) DEVICE — GLV SURG TRIUMPH ORTHO W/ALOE PF LTX 6.5 STRL

## (undated) DEVICE — INTENDED FOR TISSUE SEPARATION, AND OTHER PROCEDURES THAT REQUIRE A SHARP SURGICAL BLADE TO PUNCTURE OR CUT.: Brand: BARD-PARKER ® CARBON RIB-BACK BLADES